# Patient Record
Sex: MALE | Race: WHITE | NOT HISPANIC OR LATINO | Employment: OTHER | URBAN - METROPOLITAN AREA
[De-identification: names, ages, dates, MRNs, and addresses within clinical notes are randomized per-mention and may not be internally consistent; named-entity substitution may affect disease eponyms.]

---

## 2018-12-08 ENCOUNTER — OFFICE VISIT (OUTPATIENT)
Dept: URGENT CARE | Facility: URGENT CARE | Age: 68
End: 2018-12-08
Payer: MEDICARE

## 2018-12-08 VITALS
TEMPERATURE: 98 F | HEART RATE: 78 BPM | OXYGEN SATURATION: 99 % | WEIGHT: 184 LBS | SYSTOLIC BLOOD PRESSURE: 142 MMHG | RESPIRATION RATE: 16 BRPM | DIASTOLIC BLOOD PRESSURE: 84 MMHG

## 2018-12-08 DIAGNOSIS — Z11.59 ENCOUNTER FOR SCREENING FOR OTHER VIRAL DISEASES: ICD-10-CM

## 2018-12-08 DIAGNOSIS — R23.8 VESICULAR RASH: Primary | ICD-10-CM

## 2018-12-08 DIAGNOSIS — Z11.3 SCREEN FOR STD (SEXUALLY TRANSMITTED DISEASE): ICD-10-CM

## 2018-12-08 DIAGNOSIS — N34.2 URETHRITIS: ICD-10-CM

## 2018-12-08 PROBLEM — J45.909 ASTHMA WITHOUT STATUS ASTHMATICUS: Status: ACTIVE | Noted: 2018-12-08

## 2018-12-08 PROCEDURE — 86780 TREPONEMA PALLIDUM: CPT | Performed by: FAMILY MEDICINE

## 2018-12-08 PROCEDURE — 87529 HSV DNA AMP PROBE: CPT | Performed by: FAMILY MEDICINE

## 2018-12-08 PROCEDURE — G0499 HEPB SCREEN HIGH RISK INDIV: HCPCS | Performed by: FAMILY MEDICINE

## 2018-12-08 PROCEDURE — 87389 HIV-1 AG W/HIV-1&-2 AB AG IA: CPT | Performed by: FAMILY MEDICINE

## 2018-12-08 PROCEDURE — 87591 N.GONORRHOEAE DNA AMP PROB: CPT | Performed by: FAMILY MEDICINE

## 2018-12-08 PROCEDURE — 99203 OFFICE O/P NEW LOW 30 MIN: CPT | Performed by: FAMILY MEDICINE

## 2018-12-08 PROCEDURE — 36415 COLL VENOUS BLD VENIPUNCTURE: CPT | Performed by: FAMILY MEDICINE

## 2018-12-08 PROCEDURE — G0472 HEP C SCREEN HIGH RISK/OTHER: HCPCS | Performed by: FAMILY MEDICINE

## 2018-12-08 PROCEDURE — 87491 CHLMYD TRACH DNA AMP PROBE: CPT | Performed by: FAMILY MEDICINE

## 2018-12-08 PROCEDURE — 86706 HEP B SURFACE ANTIBODY: CPT | Performed by: FAMILY MEDICINE

## 2018-12-08 RX ORDER — ALBUTEROL SULFATE 90 UG/1
2 AEROSOL, METERED RESPIRATORY (INHALATION)
COMMUNITY
Start: 2017-06-28 | End: 2023-09-07

## 2018-12-08 RX ORDER — KETOCONAZOLE 20 MG/G
CREAM TOPICAL 2 TIMES DAILY
Qty: 60 G | Refills: 1 | Status: SHIPPED | OUTPATIENT
Start: 2018-12-08 | End: 2023-09-07

## 2018-12-08 RX ORDER — TADALAFIL 5 MG/1
5 TABLET ORAL DAILY PRN
Status: ON HOLD | COMMUNITY
Start: 2018-04-15 | End: 2023-09-10

## 2018-12-08 RX ORDER — LISINOPRIL 5 MG/1
5 TABLET ORAL
COMMUNITY
Start: 2018-09-17 | End: 2023-09-07

## 2018-12-08 RX ORDER — DOXYCYCLINE 100 MG/1
100 CAPSULE ORAL 2 TIMES DAILY
Qty: 20 CAPSULE | Refills: 0 | Status: SHIPPED | OUTPATIENT
Start: 2018-12-08 | End: 2018-12-18

## 2018-12-08 RX ORDER — HYDROXYZINE HYDROCHLORIDE 10 MG/1
10 TABLET, FILM COATED ORAL
COMMUNITY
Start: 2018-11-21 | End: 2023-09-07

## 2018-12-08 RX ORDER — VALACYCLOVIR HYDROCHLORIDE 1 G/1
1000 TABLET, FILM COATED ORAL 2 TIMES DAILY
Qty: 20 TABLET | Refills: 0 | Status: SHIPPED | OUTPATIENT
Start: 2018-12-08 | End: 2023-09-07

## 2018-12-08 NOTE — PROGRESS NOTES
SUBJECTIVE:  Chief Complaint   Patient presents with     Urgent Care     2 days ago, rash, redness, burning       Penis/Scrotum Problem         Chilnago Alfonso Jr. is a 68 year old male   Who has concern of exposure to a sexually transmitted disease and would like testing .  He had a sexual encounter with a new female partner 10 days ago without using a condom, including oral genital contact.  Has not had sexual relations for years prior to this encounter    Patient has had no  No discharge,  some painful urination, no fevers/ chills.  He has noticed an area of redness with burning sensation on the shaft of the penis  -no ulcers or blisters  Also has had soreness of the roof of the mouth and inside the lower lip-  Symptoms present 2 days  He applied triple antibiotic to the area of skin burning with no change  No pain in the testicles or prostate area        Sexually active: yes, single new partner, contraception - none     Hx of previous symptom: none    Patient Active Problem List   Diagnosis     Asthma without status asthmaticus     Diverticulitis     Mixed hyperlipidemia     Hypertension     Insomnia     Neoplasm of unspecified nature of bone, soft tissue, and skin          ALLERGIES:  Simvastatin       Current Outpatient Prescriptions   Medication     albuterol (PROAIR HFA) 108 (90 Base) MCG/ACT inhaler     doxycycline hyclate (VIBRAMYCIN) 100 MG capsule     hydrOXYzine (ATARAX) 10 MG tablet     ketoconazole (NIZORAL) 2 % external cream     lisinopril (PRINIVIL/ZESTRIL) 5 MG tablet     tadalafil (CIALIS) 5 MG tablet     valACYclovir (VALTREX) 1000 mg tablet     No current facility-administered medications for this visit.            Social History   Substance Use Topics     Smoking status: Never Smoker     Smokeless tobacco: Never Used     Alcohol use Not on file       No family history on file.     Review Of Systems    Constitutional:  Negative for fevers, chills, fatigue  Skin:  Rash / redness on penis, no  blister lesions  Eyes: negative for eye pain, visual changes  Ears/Nose/Throat: negative for earache  , sinus trouble, some sore throat, sore mouth (palate and inner lip)  Respiratory: No shortness of breath, dyspnea on exertion    Gastrointestinal: negative for vomiting, abdominal pain    Genitourinary:  Dysuria, no penile discharge  And no hematuria  Back: negative for pain with back movement,  CVA pain  Musculoskeletal: negative for generalized joint pain, joint swelling    Neurologic: negative for generalized or local weakness    Endocrine: negative for thyroid disorder and diabetes      OBJECTIVE:  /84  Pulse 78  Temp 98  F (36.7  C) (Oral)  Resp 16  Wt 184 lb (83.5 kg)  SpO2 99%    :  Penis shaft  with 3 x3 cm area of erythema with shiny spots like vesicles want to form-  Reports burning at this site.  No sores of head of the penis  No penile discharge.  No tenderness or swelling of the testicles    No inguinal adenopathy    GENERAL APPEARANCE: healthy, alert and no distress  EYES: Eyes grossly normal to inspection and PERRL  HENT: TM's normal bilaterally and nose and mouth without erythema, ulcers or lesions  NECK: no adenopathy  RESP: lungs clear to auscultation - no rales, rhonchi or wheezes  CV: regular rates and rhythm, normal S1 S2, no murmur noted  ABDOMEN:  soft, nontender, no HSM or masses and bowel sounds normal  BACK: No CVA tenderness  MS:  extremities normal- no gross deformities noted, no erythema, FROM noted in all extremities  NEURO: Normal strength and tone, sensory exam grossly normal, mentation intact and speech normal        ASSESSMENT:  Vesicular rash    Suspect herpes of the shaft of the penis, but looks like blistering will start , but not now present-  He would like empiric treatment  - will also treat for fungal infection      - HSV 1 and 2 DNA by PCR- skin swab from site on the penis  - valACYclovir (VALTREX) 1000 mg tablet; Take 1 tablet (1,000 mg) by mouth 2 times  daily  - ketoconazole (NIZORAL) 2 % external cream; Apply topically 2 times daily    Screen for STD (sexually transmitted disease)     - NEISSERIA GONORRHOEA PCR  - CHLAMYDIA TRACHOMATIS PCR  - Hepatitis B surface antigen  - Hepatitis C antibody  - HIV Antigen Antibody Combo  - Treponema Abs w Reflex to RPR and Titer  - Hepatitis B Surface Antibody       We discussed the time frames that a test may turn positive after exposure and that repeat testing may be necessary to confirm that the illness has not been transmitted  Recommend use of condoms to prevent possible transmission of infection until definitive negative testing has been confirmed.    Encounter for screening for other viral diseases      Discussed hep b as STI-   Medicare required this DX  - Hepatitis B surface antigen  - Hepatitis B Surface Antibody    Urethritis     - doxycycline hyclate (VIBRAMYCIN) 100 MG capsule; Take 1 capsule (100 mg) by mouth 2 times daily for 10 days    Discussed that urethritis could be from mycoplasma/ ureaplasma/ chlamydia- that doxy will cover all-  He wanted empiric treatment

## 2018-12-08 NOTE — MR AVS SNAPSHOT
After Visit Summary   12/8/2018    Chilango Alfonso Jr.    MRN: [id number]           Patient Information     Date Of Birth          1950        Visit Information        Provider Department      12/8/2018 8:55 AM Tiki Flores MD [facility] URGENT [facility]        Today's Diagnoses     Vesicular rash    -  1    Screen for STD (sexually transmitted disease)        Encounter for screening for other viral diseases         Urethritis          Care Instructions      Genital Herpes    Genital herpes is a common sexually transmitted infection (STI). It is caused by the herpes simplex virus (HSV). One out of 5 teens and adults carry the herpes virus. During an outbreak, it causes small blisters that break open, leaving small, painful sores in the genital area. Eventually, scabs form and the sores heal. In women, these show up most often on the skin just outside the vaginal opening. They can occur on the buttocks, anus, or cervix. In men, the sores are usually on the tip, sides, or base of the penis. They also occur on the scrotum, buttocks, or thighs.  The first outbreak begins within 2 to 3 weeks after exposure to an infected sexual partner. It may last 1 to 3 weeks. It may cause headache, muscle ache, and fevers. The first outbreak is usually the worst. Because the virus remains in the body even after the sores heal, most people will have more outbreaks. The frequency of outbreaks is different for each person. Some people will never have another outbreak. Others will have several episodes a year. Later outbreaks are usually shorter, milder, and less painful. For many, the number of outbreaks tends to decrease over time. Various factors may trigger an outbreak. These include:    Emotional stress    Menstruation    Presence of another illness (cold, flu, or fever from any cause)    Overexertion and fatigue    Weak immune system  Home care    It is very important that you do not have sexual  relations until all the herpes sores have healed completely.    Wash the affected area gently with mild soap and water. Wash your hands after touching the affected area.    You may use over-the-counter pain medicine unless another pain medicine was prescribed. If you have chronic liver or kidney disease or have ever had a stomach ulcer or gastrointestinal bleeding, talk with your healthcare provider before using these medicines. Also talk to your provider if you are taking medicine to prevent blood clots. Aspirin should never be given to anyone younger than 18 years of age who is ill with a viral infection or fever. It may cause severe liver or brain damage.    Your healthcare provider may prescribe antiviral medicine during the first outbreak. This will help the sores heal faster. Antiviral medicine may also be prescribed so that you have it ready to take at the first sign of another outbreak. This will help the symptoms go away sooner. For people with frequent outbreaks, daily preventive therapy may be prescribed. This will help reduce the frequency of attacks. Daily preventive therapy may also reduce risk of spread of herpes to your sexual partner. Discuss the risks and benefits of daily therapy with your healthcare provider.    If you are a woman who is pregnant now or may become pregnant in the future, let your healthcare provider know that you have had herpes. This may affect the way your baby is delivered.  Preventing spread to others  The virus is spread by sexual contact with someone who has the herpes virus. The risk of spread is highest when the sores are present. However, there is a chance of spreading the virus even when sores are not visible. Inform future sexual partners that you have herpes and that they may become infected. To reduce the risk of passing the virus to a partner who has never had herpes, avoid sexual relations at the first sign of an outbreak and until the sores are fully healed. Latex  barriers, such as condoms, reduce the risk of spread between outbreaks if the infected site is covered, but they do not guarantee protection.  Follow-up care  Follow up with your healthcare provider, or as advised.  People who have just learned that they have herpes may feel upset. Getting the facts about herpes can help you feel more in control. Follow up with your healthcare provider or the public health department for complete STI screening, including HIV testing.  When to seek medical advice  Call your healthcare provider right away if any of these occur:    Inability to urinate due to pain    Swelling or increasing redness in the genital area    Discharge from the vagina or penis    Increasing back or abdominal pain    Rash or joint pain  Call 911  Call 911 or get immediate medical care if any of these occur:    Unusual drowsiness, weakness, or confusion    Worsening headache or stiff neck   Date Last Reviewed: 6/1/2018 2000-2018 [employer]. [address] [place] [address]. All rights reserved. This information is not intended as a substitute for professional medical care. Always follow your healthcare professional's instructions.        Testing for Suspected STI  Your symptoms suggest that you may have a sexually transmitted infection (STI). The most common bacteria that cause STIs are chlamydia and gonorrhea. Both are highly contagious. They are passed by sexual contact with an infected partner.  Symptoms start within 1 to 3 weeks after exposure. There is usually a discharge from the penis or vagina and burning during urination. Many women with one of these infections will have only mild symptoms or no symptoms at all early in the disease.  Tests have been done to show if you have an infection with chlamydia or gonorrhea. These infections can be treated and cured with antibiotic medicine.  Home care  Don't have sex until you know that your test result is negative.  Call for the  results of your tests. If the test is positive, contact your healthcare provider, local clinic, or local public health department to be treated, or return to our facility.    You will be prescribed antibiotic medicine. Be sure to take all of the antibiotic as prescribed until it is gone or you are told to stop. Keep taking it even if you feel better.    Both you and your sexual partner or partners need to be treated, even if the partner has no symptoms.    Don't have sex until both you and your partner or partners have finished all antibiotic medicine and you are told that you are no longer contagious.  Learn about safe sex practices and use these in the future. The safest sex is with a partner who has tested negative for STIs and only has sex with you. Condoms can help prevent the spread of gonorrhea and chlamydia, but are not a guarantee.  Follow-up care  Follow up with your healthcare provider, or as advised. Call as directed for the results of your test. This is to be sure the infection has cleared. Follow up with your provider or the public health department for complete STI screening, including HIV testing, and to consider ways to prevent HIV. For more information about STIs, call the CDC information line at 082-320-1233 or look at the CDC website online.  When to seek medical advice  Call your healthcare provider if any of these occur:    Fever of 100.4 F (38.0 C) or higher, or as directed    New pain in your lower belly (abdomen) or back or pain that gets worse    Unexpected vaginal bleeding    Weakness, dizziness, or fainting    Repeated vomiting    Inability to urinate because of pain    Rash or joint pain    Painful open sores on the penis, or in or around the outer vagina or rectum    Enlarged painful lumps (lymph nodes) in the groin    Testicle pain or scrotal swelling in men   Date Last Reviewed: 4/1/2018 2000-2018 [employer]. [address] [place] [address], [place] [address]. All rights  reserved. This information is not intended as a substitute for professional medical care. Always follow your healthcare professional's instructions.        Urethritis in Men     An inflamed urethra can cause pain during urination.   The urethra is the tube that runs from the bladder through the penis. When the urethra is inflamed, it is called urethritis. The urethra becomes swollen and causes burning pain when you urinate. Other symptoms of urethritis may include itching or tingling of the penis or pus discharge from the penis. You may also have pain with sex and masturbation. Some men may not have symptoms.  What causes urethritis?  Urethritis can be caused by a bacterial or viral infection. Such an infection can lead to conditions such as a urinary tract infection (UTI) or sexually transmitted diseases (STD). Urethritis can also be caused by injury or sensitivity or allergy to chemicals in lotions and other products.  How is urethritis diagnosed?  Your healthcare provider will examine you and ask about your symptoms and health history. You may also have one or more of the following tests:    Urine test to take samples of urine and have them checked for problems.    Blood test to take a sample of blood and have it checked for problems.    Urethral discharge to take a sample of fluid from inside the urethra. A cotton swab is inserted into the opening of the penis and into the urethra.    Cystoscopy to allow the healthcare provider to look for problems in the urinary tract. The test uses a thin, flexible telescope called a cystoscope with a light and camera attached. The scope is inserted into the urethra.  How is urethritis treated?  Treatment depends on the cause of urethritis. If it s due to a bacterial infection, antibiotics (medicines that fight infection) will be given. Your healthcare provider can tell you more about your treatment options. In the meantime, your symptoms can be treated. To relieve pain and  swelling, anti-inflammatory medications, such as ibuprofen, may be given. Untreated, symptoms may get worse. Also, scar tissue can form in the urethra, causing it to narrow.  When to call your healthcare provider   Call your healthcare provider right away if you have any of the following:    Fever of 100.4 F (38.0 C) or higher     Blood in the urine or semen    Burning pain with urination    Increased urge to urinate    Discharge from the penis    Itching, tenderness, or swelling in the penis or groin    Pain during sex or masturbation   Preventing STDs  When it comes to sex, it s important to take care and be safe. Any sexual contact with the penis, vagina, anus, or mouth can spread an STD. The only sure way to prevent STDs is not having sex. But there are ways to make sex safer. Use a latex condom each time you have sex. And talk to your partner about STDs before you have sex.  Date Last Reviewed: 1/1/2017 2000-2018 [employer]. [address] [place] [address]. All rights reserved. This information is not intended as a substitute for professional medical care. Always follow your healthcare professional's instructions.                Follow-ups after your visit        Who to contact     If you have questions or need follow up information about today's clinic visit or your schedule please contact [facility] directly at [phone].  Normal or non-critical lab and imaging results will be communicated to you by MyChart, letter or phone within 4 business days after the clinic has received the results. If you do not hear from us within 7 days, please contact the clinic through MyChart or phone. If you have a critical or abnormal lab result, we will notify you by phone as soon as possible.  Submit refill requests through [employer] or call your pharmacy and they will forward the refill request to us. Please allow 3 business days for your refill to be completed.           "Additional Information About Your Visit        MyChart Information     [employer] lets you send messages to your doctor, view your test results, renew your prescriptions, schedule appointments and more. To sign up, go to [email or web address] . Click on \"Log in\" on the left side of the screen, which will take you to the Welcome page. Then click on \"Sign up Now\" on the right side of the page.     You will be asked to enter the access code listed below, as well as some personal information. Please follow the directions to create your username and password.     Your access code is: [email or web address]  Expires: 3/8/2019  9:51 AM     Your access code will  in 90 days. If you need help or a new code, please call your [facility] clinic or [phone].        Care EveryWhere ID     This is your Care EveryWhere ID. This could be used by other organizations to access your [facility] medical records  [id number]        Your Vitals Were     Pulse Temperature Respirations Pulse Oximetry          78 98  F (36.7  C) (Oral) 16 99%         Blood Pressure from Last 3 Encounters:   18 142/84    Weight from Last 3 Encounters:   18 184 lb (83.5 kg)              We Performed the Following     CHLAMYDIA TRACHOMATIS PCR     Hepatitis B Surface Antibody     Hepatitis B surface antigen     Hepatitis C antibody     HIV Antigen Antibody Combo     HSV 1 and 2 DNA by PCR     NEISSERIA GONORRHOEA PCR     Treponema Abs w Reflex to RPR and Titer          Today's Medication Changes          These changes are accurate as of 18  9:51 AM.  If you have any questions, ask your nurse or doctor.               Start taking these medicines.        Dose/Directions    doxycycline hyclate 100 MG capsule   Commonly known as:  VIBRAMYCIN   Used for:  Urethritis   Started by:  Tiki Flores MD        Dose:  100 mg   Take 1 capsule (100 mg) by mouth 2 times daily for 10 days   Quantity:  20 capsule   Refills:  0       valACYclovir 1000 mg tablet "   Commonly known as:  VALTREX   Used for:  Vesicular rash   Started by:  Tiki Flores MD        Dose:  1000 mg   Take 1 tablet (1,000 mg) by mouth 2 times daily   Quantity:  20 tablet   Refills:  0            Where to get your medicines      These medications were sent to [id number] - [place], [place] - [address]  [address], [facility] [address]     Phone:  [phone]     doxycycline hyclate 100 MG capsule    valACYclovir 1000 mg tablet                Primary Care Provider Office Phone # Fax #    Luis Miguel Gutierrez [phone]       [facility] [address]  [facility] [address]        Equal Access to Services     [facility] HospitalJOSE ALBERTO : Hadii gus castillo hadasho Soomaali, waaxda luqadaha, qaybta kaalmada adeegyada, kristi ramirez . So [facility] [phone].    ATENCIÓN: Si habla español, tiene a lacey disposición servicios gratuitos de asistencia lingüística. Llame al [phone].    We comply with applicable federal civil rights laws and [place] laws. We do not discriminate on the basis of race, color, national origin, age, disability, sex, sexual orientation, or gender identity.            Thank you!     Thank you for choosing [facility]  for your care. Our goal is always to provide you with excellent care. Hearing back from our patients is one way we can continue to improve our services. Please take a few minutes to complete the written survey that you may receive in the mail after your visit with us. Thank you!             Your Updated Medication List - Protect others around you: Learn how to safely use, store and throw away your medicines at www.disposemymeds.org.          This list is accurate as of 12/8/18  9:51 AM.  Always use your most recent med list.                   Brand Name Dispense Instructions for use Diagnosis    doxycycline hyclate 100 MG capsule    VIBRAMYCIN    20 capsule    Take 1 capsule (100 mg) by mouth 2 times daily  for 10 days    Urethritis       valACYclovir 1000 mg tablet    VALTREX    20 tablet    Take 1 tablet (1,000 mg) by mouth 2 times daily    Vesicular rash

## 2018-12-08 NOTE — PATIENT INSTRUCTIONS
Genital Herpes    Genital herpes is a common sexually transmitted infection (STI). It is caused by the herpes simplex virus (HSV). One out of 5 teens and adults carry the herpes virus. During an outbreak, it causes small blisters that break open, leaving small, painful sores in the genital area. Eventually, scabs form and the sores heal. In women, these show up most often on the skin just outside the vaginal opening. They can occur on the buttocks, anus, or cervix. In men, the sores are usually on the tip, sides, or base of the penis. They also occur on the scrotum, buttocks, or thighs.  The first outbreak begins within 2 to 3 weeks after exposure to an infected sexual partner. It may last 1 to 3 weeks. It may cause headache, muscle ache, and fevers. The first outbreak is usually the worst. Because the virus remains in the body even after the sores heal, most people will have more outbreaks. The frequency of outbreaks is different for each person. Some people will never have another outbreak. Others will have several episodes a year. Later outbreaks are usually shorter, milder, and less painful. For many, the number of outbreaks tends to decrease over time. Various factors may trigger an outbreak. These include:    Emotional stress    Menstruation    Presence of another illness (cold, flu, or fever from any cause)    Overexertion and fatigue    Weak immune system  Home care    It is very important that you do not have sexual relations until all the herpes sores have healed completely.    Wash the affected area gently with mild soap and water. Wash your hands after touching the affected area.    You may use over-the-counter pain medicine unless another pain medicine was prescribed. If you have chronic liver or kidney disease or have ever had a stomach ulcer or gastrointestinal bleeding, talk with your healthcare provider before using these medicines. Also talk to your provider if you are taking medicine to prevent  blood clots. Aspirin should never be given to anyone younger than 18 years of age who is ill with a viral infection or fever. It may cause severe liver or brain damage.    Your healthcare provider may prescribe antiviral medicine during the first outbreak. This will help the sores heal faster. Antiviral medicine may also be prescribed so that you have it ready to take at the first sign of another outbreak. This will help the symptoms go away sooner. For people with frequent outbreaks, daily preventive therapy may be prescribed. This will help reduce the frequency of attacks. Daily preventive therapy may also reduce risk of spread of herpes to your sexual partner. Discuss the risks and benefits of daily therapy with your healthcare provider.    If you are a woman who is pregnant now or may become pregnant in the future, let your healthcare provider know that you have had herpes. This may affect the way your baby is delivered.  Preventing spread to others  The virus is spread by sexual contact with someone who has the herpes virus. The risk of spread is highest when the sores are present. However, there is a chance of spreading the virus even when sores are not visible. Inform future sexual partners that you have herpes and that they may become infected. To reduce the risk of passing the virus to a partner who has never had herpes, avoid sexual relations at the first sign of an outbreak and until the sores are fully healed. Latex barriers, such as condoms, reduce the risk of spread between outbreaks if the infected site is covered, but they do not guarantee protection.  Follow-up care  Follow up with your healthcare provider, or as advised.  People who have just learned that they have herpes may feel upset. Getting the facts about herpes can help you feel more in control. Follow up with your healthcare provider or the public health department for complete STI screening, including HIV testing.  When to seek medical  advice  Call your healthcare provider right away if any of these occur:    Inability to urinate due to pain    Swelling or increasing redness in the genital area    Discharge from the vagina or penis    Increasing back or abdominal pain    Rash or joint pain  Call 911  Call 911 or get immediate medical care if any of these occur:    Unusual drowsiness, weakness, or confusion    Worsening headache or stiff neck   Date Last Reviewed: 6/1/2018 2000-2018 [employer]. [address] [place] [address], [place] [address]. All rights reserved. This information is not intended as a substitute for professional medical care. Always follow your healthcare professional's instructions.        Testing for Suspected STI  Your symptoms suggest that you may have a sexually transmitted infection (STI). The most common bacteria that cause STIs are chlamydia and gonorrhea. Both are highly contagious. They are passed by sexual contact with an infected partner.  Symptoms start within 1 to 3 weeks after exposure. There is usually a discharge from the penis or vagina and burning during urination. Many women with one of these infections will have only mild symptoms or no symptoms at all early in the disease.  Tests have been done to show if you have an infection with chlamydia or gonorrhea. These infections can be treated and cured with antibiotic medicine.  Home care  Don't have sex until you know that your test result is negative.  Call for the results of your tests. If the test is positive, contact your healthcare provider, local clinic, or local public health department to be treated, or return to our facility.    You will be prescribed antibiotic medicine. Be sure to take all of the antibiotic as prescribed until it is gone or you are told to stop. Keep taking it even if you feel better.    Both you and your sexual partner or partners need to be treated, even if the partner has no symptoms.    Don't have sex until both you and  your partner or partners have finished all antibiotic medicine and you are told that you are no longer contagious.  Learn about safe sex practices and use these in the future. The safest sex is with a partner who has tested negative for STIs and only has sex with you. Condoms can help prevent the spread of gonorrhea and chlamydia, but are not a guarantee.  Follow-up care  Follow up with your healthcare provider, or as advised. Call as directed for the results of your test. This is to be sure the infection has cleared. Follow up with your provider or the public health department for complete STI screening, including HIV testing, and to consider ways to prevent HIV. For more information about STIs, call the CDC information line at 307-913-3677 or look at the CDC website online.  When to seek medical advice  Call your healthcare provider if any of these occur:    Fever of 100.4 F (38.0 C) or higher, or as directed    New pain in your lower belly (abdomen) or back or pain that gets worse    Unexpected vaginal bleeding    Weakness, dizziness, or fainting    Repeated vomiting    Inability to urinate because of pain    Rash or joint pain    Painful open sores on the penis, or in or around the outer vagina or rectum    Enlarged painful lumps (lymph nodes) in the groin    Testicle pain or scrotal swelling in men   Date Last Reviewed: 4/1/2018 2000-2018 [employer]. [address] [place] [address], [place] [id number]. All rights reserved. This information is not intended as a substitute for professional medical care. Always follow your healthcare professional's instructions.        Urethritis in Men     An inflamed urethra can cause pain during urination.   The urethra is the tube that runs from the bladder through the penis. When the urethra is inflamed, it is called urethritis. The urethra becomes swollen and causes burning pain when you urinate. Other symptoms of urethritis may include itching or tingling of the  penis or pus discharge from the penis. You may also have pain with sex and masturbation. Some men may not have symptoms.  What causes urethritis?  Urethritis can be caused by a bacterial or viral infection. Such an infection can lead to conditions such as a urinary tract infection (UTI) or sexually transmitted diseases (STD). Urethritis can also be caused by injury or sensitivity or allergy to chemicals in lotions and other products.  How is urethritis diagnosed?  Your healthcare provider will examine you and ask about your symptoms and health history. You may also have one or more of the following tests:    Urine test to take samples of urine and have them checked for problems.    Blood test to take a sample of blood and have it checked for problems.    Urethral discharge to take a sample of fluid from inside the urethra. A cotton swab is inserted into the opening of the penis and into the urethra.    Cystoscopy to allow the healthcare provider to look for problems in the urinary tract. The test uses a thin, flexible telescope called a cystoscope with a light and camera attached. The scope is inserted into the urethra.  How is urethritis treated?  Treatment depends on the cause of urethritis. If it s due to a bacterial infection, antibiotics (medicines that fight infection) will be given. Your healthcare provider can tell you more about your treatment options. In the meantime, your symptoms can be treated. To relieve pain and swelling, anti-inflammatory medications, such as ibuprofen, may be given. Untreated, symptoms may get worse. Also, scar tissue can form in the urethra, causing it to narrow.  When to call your healthcare provider   Call your healthcare provider right away if you have any of the following:    Fever of 100.4 F (38.0 C) or higher     Blood in the urine or semen    Burning pain with urination    Increased urge to urinate    Discharge from the penis    Itching, tenderness, or swelling in the penis  or groin    Pain during sex or masturbation   Preventing STDs  When it comes to sex, it s important to take care and be safe. Any sexual contact with the penis, vagina, anus, or mouth can spread an STD. The only sure way to prevent STDs is not having sex. But there are ways to make sex safer. Use a latex condom each time you have sex. And talk to your partner about STDs before you have sex.  Date Last Reviewed: 1/1/2017 2000-2018 [employer]. [address] [place] [address], [place] [address]. All rights reserved. This information is not intended as a substitute for professional medical care. Always follow your healthcare professional's instructions.

## 2018-12-09 LAB
C TRACH DNA SPEC QL NAA+PROBE: NEGATIVE
N GONORRHOEA DNA SPEC QL NAA+PROBE: NEGATIVE
SPECIMEN SOURCE: NORMAL
SPECIMEN SOURCE: NORMAL
T PALLIDUM AB SER QL: NONREACTIVE

## 2018-12-10 LAB
HBV SURFACE AB SERPL IA-ACNC: 1.38 M[IU]/ML
HBV SURFACE AG SERPL QL IA: NONREACTIVE
HCV AB SERPL QL IA: NONREACTIVE
HIV 1+2 AB+HIV1 P24 AG SERPL QL IA: NONREACTIVE
HSV1 DNA SPEC QL NAA+PROBE: NEGATIVE
HSV2 DNA SPEC QL NAA+PROBE: NEGATIVE
SPECIMEN SOURCE: NORMAL

## 2019-10-02 ENCOUNTER — HEALTH MAINTENANCE LETTER (OUTPATIENT)
Age: 69
End: 2019-10-02

## 2019-12-16 ENCOUNTER — HEALTH MAINTENANCE LETTER (OUTPATIENT)
Age: 69
End: 2019-12-16

## 2021-01-15 ENCOUNTER — HEALTH MAINTENANCE LETTER (OUTPATIENT)
Age: 71
End: 2021-01-15

## 2021-09-05 ENCOUNTER — HEALTH MAINTENANCE LETTER (OUTPATIENT)
Age: 71
End: 2021-09-05

## 2022-02-20 ENCOUNTER — HEALTH MAINTENANCE LETTER (OUTPATIENT)
Age: 72
End: 2022-02-20

## 2022-10-22 ENCOUNTER — HEALTH MAINTENANCE LETTER (OUTPATIENT)
Age: 72
End: 2022-10-22

## 2023-04-01 ENCOUNTER — HEALTH MAINTENANCE LETTER (OUTPATIENT)
Age: 73
End: 2023-04-01

## 2023-09-06 ENCOUNTER — APPOINTMENT (OUTPATIENT)
Dept: CT IMAGING | Facility: CLINIC | Age: 73
End: 2023-09-06
Attending: EMERGENCY MEDICINE
Payer: MEDICARE

## 2023-09-06 ENCOUNTER — HOSPITAL ENCOUNTER (OUTPATIENT)
Facility: CLINIC | Age: 73
Setting detail: OBSERVATION
Discharge: HOME | End: 2023-09-10
Attending: EMERGENCY MEDICINE | Admitting: INTERNAL MEDICINE
Payer: MEDICARE

## 2023-09-06 ENCOUNTER — APPOINTMENT (OUTPATIENT)
Dept: MRI IMAGING | Facility: CLINIC | Age: 73
End: 2023-09-06
Attending: EMERGENCY MEDICINE
Payer: MEDICARE

## 2023-09-06 DIAGNOSIS — F41.9 ANXIETY: ICD-10-CM

## 2023-09-06 DIAGNOSIS — F10.920 ALCOHOLIC INTOXICATION WITHOUT COMPLICATION (H): ICD-10-CM

## 2023-09-06 DIAGNOSIS — E78.2 MIXED HYPERLIPIDEMIA: ICD-10-CM

## 2023-09-06 DIAGNOSIS — R41.0 CONFUSION: Primary | ICD-10-CM

## 2023-09-06 LAB
ALBUMIN UR-MCNC: NEGATIVE MG/DL
ANION GAP SERPL CALCULATED.3IONS-SCNC: 13 MMOL/L (ref 7–15)
APPEARANCE UR: CLEAR
APTT PPP: 27 SECONDS (ref 22–38)
BASOPHILS # BLD AUTO: 0.1 10E3/UL (ref 0–0.2)
BASOPHILS NFR BLD AUTO: 1 %
BILIRUB UR QL STRIP: NEGATIVE
BUN SERPL-MCNC: 23.8 MG/DL (ref 8–23)
CALCIUM SERPL-MCNC: 9.2 MG/DL (ref 8.8–10.2)
CHLORIDE SERPL-SCNC: 101 MMOL/L (ref 98–107)
COLOR UR AUTO: ABNORMAL
CREAT SERPL-MCNC: 1.33 MG/DL (ref 0.67–1.17)
DEPRECATED HCO3 PLAS-SCNC: 22 MMOL/L (ref 22–29)
EGFRCR SERPLBLD CKD-EPI 2021: 56 ML/MIN/1.73M2
EOSINOPHIL # BLD AUTO: 0.2 10E3/UL (ref 0–0.7)
EOSINOPHIL NFR BLD AUTO: 2 %
ERYTHROCYTE [DISTWIDTH] IN BLOOD BY AUTOMATED COUNT: 12.6 % (ref 10–15)
ETHANOL SERPL-MCNC: 0.25 G/DL
GLUCOSE SERPL-MCNC: 82 MG/DL (ref 70–99)
GLUCOSE UR STRIP-MCNC: NEGATIVE MG/DL
HCT VFR BLD AUTO: 40.2 % (ref 40–53)
HGB BLD-MCNC: 13.6 G/DL (ref 13.3–17.7)
HGB UR QL STRIP: NEGATIVE
HOLD SPECIMEN: NORMAL
IMM GRANULOCYTES # BLD: 0 10E3/UL
IMM GRANULOCYTES NFR BLD: 0 %
INR PPP: 1.03 (ref 0.85–1.15)
KETONES UR STRIP-MCNC: NEGATIVE MG/DL
LEUKOCYTE ESTERASE UR QL STRIP: NEGATIVE
LYMPHOCYTES # BLD AUTO: 2.8 10E3/UL (ref 0.8–5.3)
LYMPHOCYTES NFR BLD AUTO: 27 %
MAGNESIUM SERPL-MCNC: 2.2 MG/DL (ref 1.7–2.3)
MCH RBC QN AUTO: 33.3 PG (ref 26.5–33)
MCHC RBC AUTO-ENTMCNC: 33.8 G/DL (ref 31.5–36.5)
MCV RBC AUTO: 99 FL (ref 78–100)
MONOCYTES # BLD AUTO: 0.9 10E3/UL (ref 0–1.3)
MONOCYTES NFR BLD AUTO: 9 %
MUCOUS THREADS #/AREA URNS LPF: PRESENT /LPF
NEUTROPHILS # BLD AUTO: 6.1 10E3/UL (ref 1.6–8.3)
NEUTROPHILS NFR BLD AUTO: 61 %
NITRATE UR QL: NEGATIVE
NRBC # BLD AUTO: 0 10E3/UL
NRBC BLD AUTO-RTO: 0 /100
PH UR STRIP: 5 [PH] (ref 5–7)
PLATELET # BLD AUTO: 224 10E3/UL (ref 150–450)
POTASSIUM SERPL-SCNC: 3.8 MMOL/L (ref 3.4–5.3)
RBC # BLD AUTO: 4.08 10E6/UL (ref 4.4–5.9)
RBC URINE: <1 /HPF
SODIUM SERPL-SCNC: 136 MMOL/L (ref 136–145)
SP GR UR STRIP: 1.02 (ref 1–1.03)
TROPONIN T SERPL HS-MCNC: 12 NG/L
UROBILINOGEN UR STRIP-MCNC: NORMAL MG/DL
WBC # BLD AUTO: 10.2 10E3/UL (ref 4–11)
WBC URINE: <1 /HPF

## 2023-09-06 PROCEDURE — 85610 PROTHROMBIN TIME: CPT | Performed by: EMERGENCY MEDICINE

## 2023-09-06 PROCEDURE — 36415 COLL VENOUS BLD VENIPUNCTURE: CPT | Performed by: EMERGENCY MEDICINE

## 2023-09-06 PROCEDURE — 85730 THROMBOPLASTIN TIME PARTIAL: CPT | Performed by: EMERGENCY MEDICINE

## 2023-09-06 PROCEDURE — 258N000003 HC RX IP 258 OP 636: Performed by: EMERGENCY MEDICINE

## 2023-09-06 PROCEDURE — 250N000011 HC RX IP 250 OP 636: Performed by: EMERGENCY MEDICINE

## 2023-09-06 PROCEDURE — 84484 ASSAY OF TROPONIN QUANT: CPT | Performed by: EMERGENCY MEDICINE

## 2023-09-06 PROCEDURE — 82077 ASSAY SPEC XCP UR&BREATH IA: CPT | Performed by: EMERGENCY MEDICINE

## 2023-09-06 PROCEDURE — 96376 TX/PRO/DX INJ SAME DRUG ADON: CPT | Mod: XU

## 2023-09-06 PROCEDURE — 250N000009 HC RX 250: Performed by: EMERGENCY MEDICINE

## 2023-09-06 PROCEDURE — 82310 ASSAY OF CALCIUM: CPT | Performed by: EMERGENCY MEDICINE

## 2023-09-06 PROCEDURE — 70498 CT ANGIOGRAPHY NECK: CPT | Mod: MA

## 2023-09-06 PROCEDURE — 99285 EMERGENCY DEPT VISIT HI MDM: CPT | Mod: 25

## 2023-09-06 PROCEDURE — 81001 URINALYSIS AUTO W/SCOPE: CPT | Performed by: EMERGENCY MEDICINE

## 2023-09-06 PROCEDURE — 70496 CT ANGIOGRAPHY HEAD: CPT | Mod: MA

## 2023-09-06 PROCEDURE — 70450 CT HEAD/BRAIN W/O DYE: CPT | Mod: MA

## 2023-09-06 PROCEDURE — 70551 MRI BRAIN STEM W/O DYE: CPT | Mod: MA

## 2023-09-06 PROCEDURE — 93005 ELECTROCARDIOGRAM TRACING: CPT

## 2023-09-06 PROCEDURE — 83735 ASSAY OF MAGNESIUM: CPT | Performed by: EMERGENCY MEDICINE

## 2023-09-06 PROCEDURE — 85025 COMPLETE CBC W/AUTO DIFF WBC: CPT | Performed by: EMERGENCY MEDICINE

## 2023-09-06 PROCEDURE — 96374 THER/PROPH/DIAG INJ IV PUSH: CPT | Mod: 59

## 2023-09-06 PROCEDURE — 99223 1ST HOSP IP/OBS HIGH 75: CPT | Mod: AI | Performed by: INTERNAL MEDICINE

## 2023-09-06 RX ORDER — LORAZEPAM 2 MG/ML
0.5 INJECTION INTRAMUSCULAR ONCE
Status: COMPLETED | OUTPATIENT
Start: 2023-09-06 | End: 2023-09-06

## 2023-09-06 RX ORDER — IOPAMIDOL 755 MG/ML
500 INJECTION, SOLUTION INTRAVASCULAR ONCE
Status: COMPLETED | OUTPATIENT
Start: 2023-09-06 | End: 2023-09-06

## 2023-09-06 RX ADMIN — SODIUM CHLORIDE 80 ML: 9 INJECTION, SOLUTION INTRAVENOUS at 18:42

## 2023-09-06 RX ADMIN — IOPAMIDOL 75 ML: 755 INJECTION, SOLUTION INTRAVENOUS at 18:42

## 2023-09-06 RX ADMIN — SODIUM CHLORIDE 1000 ML: 9 INJECTION, SOLUTION INTRAVENOUS at 23:50

## 2023-09-06 RX ADMIN — LORAZEPAM 0.5 MG: 2 INJECTION INTRAMUSCULAR; INTRAVENOUS at 22:37

## 2023-09-06 RX ADMIN — LORAZEPAM 0.5 MG: 2 INJECTION INTRAMUSCULAR; INTRAVENOUS at 20:53

## 2023-09-06 ASSESSMENT — ACTIVITIES OF DAILY LIVING (ADL)
ADLS_ACUITY_SCORE: 35
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
## 2023-09-06 NOTE — ED NOTES
Assisted with patient triage (ambulance). Placed Pt. on monitor (5-lead continous ECG, pulse ox, BP cuff) EKG complete. [facility]
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
## 2023-09-07 LAB
AMPHETAMINES UR QL SCN: NORMAL
ANION GAP SERPL CALCULATED.3IONS-SCNC: 16 MMOL/L (ref 7–15)
ATRIAL RATE - MUSE: 78 BPM
BARBITURATES UR QL SCN: NORMAL
BENZODIAZ UR QL SCN: NORMAL
BUN SERPL-MCNC: 20.2 MG/DL (ref 8–23)
BZE UR QL SCN: NORMAL
CALCIUM SERPL-MCNC: 9.1 MG/DL (ref 8.8–10.2)
CANNABINOIDS UR QL SCN: NORMAL
CHLORIDE SERPL-SCNC: 103 MMOL/L (ref 98–107)
CREAT SERPL-MCNC: 1.32 MG/DL (ref 0.67–1.17)
DEPRECATED HCO3 PLAS-SCNC: 19 MMOL/L (ref 22–29)
DIASTOLIC BLOOD PRESSURE - MUSE: NORMAL MMHG
EGFRCR SERPLBLD CKD-EPI 2021: 56 ML/MIN/1.73M2
ERYTHROCYTE [DISTWIDTH] IN BLOOD BY AUTOMATED COUNT: 12.5 % (ref 10–15)
GLUCOSE SERPL-MCNC: 92 MG/DL (ref 70–99)
HCT VFR BLD AUTO: 39.6 % (ref 40–53)
HGB BLD-MCNC: 13.7 G/DL (ref 13.3–17.7)
INTERPRETATION ECG - MUSE: NORMAL
MAGNESIUM SERPL-MCNC: 1.8 MG/DL (ref 1.7–2.3)
MCH RBC QN AUTO: 33.7 PG (ref 26.5–33)
MCHC RBC AUTO-ENTMCNC: 34.6 G/DL (ref 31.5–36.5)
MCV RBC AUTO: 97 FL (ref 78–100)
OPIATES UR QL SCN: NORMAL
P AXIS - MUSE: 64 DEGREES
PHOSPHATE SERPL-MCNC: 3.7 MG/DL (ref 2.5–4.5)
PLATELET # BLD AUTO: 210 10E3/UL (ref 150–450)
POTASSIUM SERPL-SCNC: 4.3 MMOL/L (ref 3.4–5.3)
PR INTERVAL - MUSE: 188 MS
QRS DURATION - MUSE: 90 MS
QT - MUSE: 392 MS
QTC - MUSE: 446 MS
R AXIS - MUSE: 9 DEGREES
RBC # BLD AUTO: 4.07 10E6/UL (ref 4.4–5.9)
SODIUM SERPL-SCNC: 138 MMOL/L (ref 136–145)
SYSTOLIC BLOOD PRESSURE - MUSE: NORMAL MMHG
T AXIS - MUSE: 40 DEGREES
VENTRICULAR RATE- MUSE: 78 BPM
WBC # BLD AUTO: 11.2 10E3/UL (ref 4–11)

## 2023-09-07 PROCEDURE — 99233 SBSQ HOSP IP/OBS HIGH 50: CPT | Performed by: INTERNAL MEDICINE

## 2023-09-07 PROCEDURE — 80307 DRUG TEST PRSMV CHEM ANLYZR: CPT | Performed by: INTERNAL MEDICINE

## 2023-09-07 PROCEDURE — 85027 COMPLETE CBC AUTOMATED: CPT | Performed by: INTERNAL MEDICINE

## 2023-09-07 PROCEDURE — G0378 HOSPITAL OBSERVATION PER HR: HCPCS

## 2023-09-07 PROCEDURE — 80048 BASIC METABOLIC PNL TOTAL CA: CPT | Performed by: INTERNAL MEDICINE

## 2023-09-07 PROCEDURE — 258N000003 HC RX IP 258 OP 636: Performed by: INTERNAL MEDICINE

## 2023-09-07 PROCEDURE — 250N000013 HC RX MED GY IP 250 OP 250 PS 637: Performed by: INTERNAL MEDICINE

## 2023-09-07 PROCEDURE — 96361 HYDRATE IV INFUSION ADD-ON: CPT

## 2023-09-07 PROCEDURE — 84100 ASSAY OF PHOSPHORUS: CPT | Performed by: INTERNAL MEDICINE

## 2023-09-07 PROCEDURE — 36415 COLL VENOUS BLD VENIPUNCTURE: CPT | Performed by: INTERNAL MEDICINE

## 2023-09-07 PROCEDURE — 83735 ASSAY OF MAGNESIUM: CPT | Performed by: INTERNAL MEDICINE

## 2023-09-07 RX ORDER — MULTIPLE VITAMINS W/ MINERALS TAB 9MG-400MCG
1 TAB ORAL DAILY
Status: DISCONTINUED | OUTPATIENT
Start: 2023-09-08 | End: 2023-09-08

## 2023-09-07 RX ORDER — ESCITALOPRAM OXALATE 10 MG/1
10 TABLET ORAL DAILY
Status: DISCONTINUED | OUTPATIENT
Start: 2023-09-07 | End: 2023-09-10 | Stop reason: HOSPADM

## 2023-09-07 RX ORDER — FOLIC ACID 1 MG/1
1 TABLET ORAL DAILY
Status: DISCONTINUED | OUTPATIENT
Start: 2023-09-07 | End: 2023-09-08

## 2023-09-07 RX ORDER — GABAPENTIN 300 MG/1
300 CAPSULE ORAL EVERY 8 HOURS
Status: DISCONTINUED | OUTPATIENT
Start: 2023-09-12 | End: 2023-09-08

## 2023-09-07 RX ORDER — ESCITALOPRAM OXALATE 5 MG/1
5 TABLET ORAL DAILY
Status: ON HOLD | COMMUNITY
End: 2023-09-10

## 2023-09-07 RX ORDER — GABAPENTIN 300 MG/1
900 CAPSULE ORAL EVERY 8 HOURS
Status: DISCONTINUED | OUTPATIENT
Start: 2023-09-07 | End: 2023-09-08

## 2023-09-07 RX ORDER — HALOPERIDOL 5 MG/ML
2.5-5 INJECTION INTRAMUSCULAR EVERY 6 HOURS PRN
Status: DISCONTINUED | OUTPATIENT
Start: 2023-09-07 | End: 2023-09-08

## 2023-09-07 RX ORDER — SODIUM CHLORIDE 9 MG/ML
INJECTION, SOLUTION INTRAVENOUS CONTINUOUS
Status: ACTIVE | OUTPATIENT
Start: 2023-09-07 | End: 2023-09-07

## 2023-09-07 RX ORDER — ACETAMINOPHEN 325 MG/1
650 TABLET ORAL EVERY 6 HOURS PRN
Status: DISCONTINUED | OUTPATIENT
Start: 2023-09-07 | End: 2023-09-10 | Stop reason: HOSPADM

## 2023-09-07 RX ORDER — ESCITALOPRAM OXALATE 5 MG/1
5 TABLET ORAL DAILY
Status: DISCONTINUED | OUTPATIENT
Start: 2023-09-07 | End: 2023-09-10 | Stop reason: HOSPADM

## 2023-09-07 RX ORDER — FLUMAZENIL 0.1 MG/ML
0.2 INJECTION, SOLUTION INTRAVENOUS
Status: DISCONTINUED | OUTPATIENT
Start: 2023-09-07 | End: 2023-09-10 | Stop reason: HOSPADM

## 2023-09-07 RX ORDER — ONDANSETRON 2 MG/ML
4 INJECTION INTRAMUSCULAR; INTRAVENOUS EVERY 6 HOURS PRN
Status: DISCONTINUED | OUTPATIENT
Start: 2023-09-07 | End: 2023-09-10 | Stop reason: HOSPADM

## 2023-09-07 RX ORDER — ACETAMINOPHEN 650 MG/1
650 SUPPOSITORY RECTAL EVERY 6 HOURS PRN
Status: DISCONTINUED | OUTPATIENT
Start: 2023-09-07 | End: 2023-09-10 | Stop reason: HOSPADM

## 2023-09-07 RX ORDER — GABAPENTIN 100 MG/1
100 CAPSULE ORAL EVERY 8 HOURS
Status: DISCONTINUED | OUTPATIENT
Start: 2023-09-14 | End: 2023-09-08

## 2023-09-07 RX ORDER — ATORVASTATIN CALCIUM 10 MG/1
10 TABLET, FILM COATED ORAL DAILY
Status: ON HOLD | COMMUNITY
End: 2023-09-10

## 2023-09-07 RX ORDER — ONDANSETRON 4 MG/1
4 TABLET, ORALLY DISINTEGRATING ORAL EVERY 6 HOURS PRN
Status: DISCONTINUED | OUTPATIENT
Start: 2023-09-07 | End: 2023-09-10 | Stop reason: HOSPADM

## 2023-09-07 RX ORDER — GABAPENTIN 300 MG/1
600 CAPSULE ORAL EVERY 8 HOURS
Status: DISCONTINUED | OUTPATIENT
Start: 2023-09-10 | End: 2023-09-08

## 2023-09-07 RX ORDER — ATORVASTATIN CALCIUM 10 MG/1
10 TABLET, FILM COATED ORAL DAILY
Status: DISCONTINUED | OUTPATIENT
Start: 2023-09-07 | End: 2023-09-10 | Stop reason: HOSPADM

## 2023-09-07 RX ORDER — LORAZEPAM 2 MG/ML
1-2 INJECTION INTRAMUSCULAR EVERY 30 MIN PRN
Status: DISCONTINUED | OUTPATIENT
Start: 2023-09-07 | End: 2023-09-08

## 2023-09-07 RX ORDER — GABAPENTIN 600 MG/1
1200 TABLET ORAL ONCE
Status: COMPLETED | OUTPATIENT
Start: 2023-09-07 | End: 2023-09-07

## 2023-09-07 RX ORDER — OLANZAPINE 5 MG/1
5-10 TABLET, ORALLY DISINTEGRATING ORAL EVERY 6 HOURS PRN
Status: DISCONTINUED | OUTPATIENT
Start: 2023-09-07 | End: 2023-09-08

## 2023-09-07 RX ORDER — LORAZEPAM 1 MG/1
1-2 TABLET ORAL EVERY 30 MIN PRN
Status: DISCONTINUED | OUTPATIENT
Start: 2023-09-07 | End: 2023-09-08

## 2023-09-07 RX ORDER — ESCITALOPRAM OXALATE 10 MG/1
10 TABLET ORAL DAILY
Status: ON HOLD | COMMUNITY
End: 2023-09-10

## 2023-09-07 RX ADMIN — ATORVASTATIN CALCIUM 10 MG: 10 TABLET, FILM COATED ORAL at 12:18

## 2023-09-07 RX ADMIN — GABAPENTIN 900 MG: 300 CAPSULE ORAL at 21:22

## 2023-09-07 RX ADMIN — GABAPENTIN 1200 MG: 600 TABLET, FILM COATED ORAL at 13:33

## 2023-09-07 RX ADMIN — SODIUM CHLORIDE: 9 INJECTION, SOLUTION INTRAVENOUS at 10:26

## 2023-09-07 RX ADMIN — THIAMINE HCL TAB 100 MG 100 MG: 100 TAB at 12:19

## 2023-09-07 RX ADMIN — ESCITALOPRAM 10 MG: 5 TABLET, FILM COATED ORAL at 12:18

## 2023-09-07 RX ADMIN — FOLIC ACID 1 MG: 1 TABLET ORAL at 13:33

## 2023-09-07 RX ADMIN — ESCITALOPRAM OXALATE 5 MG: 5 TABLET, FILM COATED ORAL at 12:21

## 2023-09-07 ASSESSMENT — ACTIVITIES OF DAILY LIVING (ADL)
ADLS_ACUITY_SCORE: 37
ADLS_ACUITY_SCORE: 35
ADLS_ACUITY_SCORE: 37
ADLS_ACUITY_SCORE: 37
ADLS_ACUITY_SCORE: 33
ADLS_ACUITY_SCORE: 37
ADLS_ACUITY_SCORE: 37
ADLS_ACUITY_SCORE: 33
ADLS_ACUITY_SCORE: 35
ADLS_ACUITY_SCORE: 37

## 2023-09-07 NOTE — PHARMACY-ADMISSION MEDICATION HISTORY
Pharmacist Admission Medication History    Admission medication history is complete. The information provided in this note is only as accurate as the sources available at the time of the update.    Medication reconciliation/reorder completed by provider prior to medication history? No    Information Source(s): Patient via in-person    Pertinent Information: none    Changes made to PTA medication list:  Added: atorvastatin, escitalopram  Deleted: albuterol, hydroxyzine, ketoconazole crm, lisinopril, valacyclovir  Changed: None    Medication Affordability:  Not including over the counter (OTC) medications, was there a time in the past 3 months when you did not take your medications as prescribed because of cost?: No    Allergies reviewed with patient and updates made in EHR: no  Medication History Completed By: Nir Nesbitt RPH 9/7/2023 8:48 AM    Prior to Admission medications    Medication Sig Last Dose Taking? Auth Provider Long Term End Date   atorvastatin (LIPITOR) 10 MG tablet Take 10 mg by mouth daily 9/6/2023 at am Yes Unknown, Entered By History Yes    escitalopram (LEXAPRO) 10 MG tablet Take 10 mg by mouth daily Take along with 5 mg tablet for total dose of 15 mg 9/6/2023 at am Yes Unknown, Entered By History Yes    escitalopram (LEXAPRO) 5 MG tablet Take 5 mg by mouth daily Take along with 10 mg tablet for total dose of 15 mg 9/6/2023 at am Yes Unknown, Entered By History Yes    tadalafil (CIALIS) 5 MG tablet Take 5 mg by mouth daily as needed (ED) More than a month Yes Reported, Patient Yes

## 2023-09-07 NOTE — PLAN OF CARE
[facility]    ED Boarding Nurse Handoff Addendum Report:    Date/time: 9/7/2023, 6:40 AM    Activity Level: assist of 1    Fall Risk: Yes:  nonskid shoes/slippers when out of bed, arm band in place, patient and family education, assistive device/personal items within reach, activity supervised, and bedside attendant     Active Infusions: N/A    Current Meds Due: See MAR    Current care needs: See Epic     Oxygen requirements (liters/min and/or FiO2): N/A    Respiratory status: Room air    Vital signs (within last 30 minutes):    Vitals:    09/06/23 2215 09/06/23 2230 09/07/23 0232 09/07/23 0616   BP: (!) 145/92 (!) 160/102 (!) 162/96 (!) 142/89   BP Location:   Right arm Right arm   Pulse: 72 82 89 97   Resp:    18   Temp:   98.2  F (36.8  C) 98.1  F (36.7  C)   TempSrc:   Oral Oral   SpO2:   98% 94%   Weight:           Focused assessment within last 30 minutes:    A&O x3, disoriented to situation. VSS, RA. Up w/ A1 but can be unsteady. Mainly standing up to use bedside urinal. Voiding throughout the night. Denies pain or discomfort. Visibly anxious w/ mild intermittent confusion, sitter at bedside for safety. Pt also has tremor. Face is flushed. Bilateral AC PIVs SL. Plan for SW.     ED Boarding Nurse name: Yulia Da Silva RN

## 2023-09-07 NOTE — H&P
[facility]  Hospitalist Admission Note  Name: Chilango Alfonso Jr.    MRN: [id number]  YOB: 1950    Age: 73 year old  Date of admission: 9/6/2023  Primary care provider: Luis Miguel Gutierrez    Chief Complaint:  confusion    Assessment and Plan:   Acute alcohol intoxication  Possible underlying cognitive impairment: Per report he was found hunched forward in his car at a stoplight somnolent with minimal interaction so he was brought in by EMS.  Some collateral information was able to be provided by a neighbor who recognized the patient, but that person is not here.  Per chart review he had a similar episode about a year ago and he asked his primary care doctor to to be seen by a neurologist at their visit 2 months ago due to concerns for some cognitive impairment.  He is able to tell me he lives alone.  A stroke code was called in the ER with initial CTA head and neck showing possible anterior frontal lobe ischemia and his distal right vertebral artery cannot be seen.  He did received 2 doses of 0.5 mg IV lorazepam to get a brain MRI completed which did not show any acute pathology other than some mild to moderate general brain atrophy.  He has a slight USMAN otherwise CBC, BMP, urinalysis are unremarkable.  Despite blowing 0 on a breathalyzer test his serum ethanol level is quite elevated at 0.25 which would fit with my current history and exam of an acutely intoxicated person.  He says he went to dinner earlier this evening and was driving home from there, but does not remember anything after this.  He was able to tell me who the president was.  He said the year was 1923.  -Observe overnight  -Reassess mental status during the day.  Recommend social work reach out to patient's family as reportedly they had some concerns regarding his cognitive impairment living alone  -Outpatient neurocognitive testing recommended    Mild USMAN: Creatinine 1.33 with most recent creatinine levels 1.1-1.2  "consistent with mild USMAN which is suspect is hypovolemic as he is acutely intoxicated.  -Give 1 L NS now then repeat BMP in the morning    Incidental finding, pulmonary nodule: 6 mm noncalcified left upper lobe pulmonary nodule seen on CTA head and neck  -Repeat CT chest in 6 months is recommended, he should be informed of this tomorrow for follow-up with PCP    MDD  Anxiety: Saw his PCP for increasing anxiety 2 months ago.  At that time he was taking escitalopram 15 mg daily which should be continued if he can confirm he is taking this when he is sober.    BPH: monitor for any retention with bladder scan as needed if not voiding.    HLD: can resume atorvastatin at home if taking.    DVT Prophylaxis: Low Risk/Ambulatory with no VTE prophylaxis indicated  Code Status: Full Code by default, he is not able to have this conversation currently  FEN: Regular diet, 1 L NS  Discharge Dispo: States he lives alone.  Per report family concerned and have counseled MSW to reach out to them  Estimated Disch Date / # of Days until Disch: Admit to observation and reassess mental status in the morning.      History of Present Illness:  Chilango Alfonso Jr. is a 73 year old male with PMH including MDD, anxiety, HLD, and BPH who presents via EMS for confusion.  He was reportedly brought in after being found hunched forward in his car at a stoplight and did not move for 2 cycles of the stoplight.  Reportedly blew 0's on a breathalyzer.  He was somewhat somnolent and very confused so he was brought in for evaluation.  The patient cannot tell me that he was driving home from dinner earlier this evening.  I asked him how much alcohol he had to drink any was not sure about this.  He was able to tell me the name of the restaurant.  When I asked him who he was at the restaurant with he said \" the people there.\"  He was able to tell me the president's name is Ryan.  He said the year was 1923 when asked.  He was very surprised about being " found confused and slumped forward in his car.  He denies something like this ever happening before.  He asked if the police were involved and I said I did not know.  He denies any headache, fever, chills, nausea, vomiting, chest pain, abdominal pain, dysuria.    History obtained from patient, medical record, and from Dr. Russell in the emergency department.  Blood pressure 129/82, heart rate 78, temperature 97.6, oxygen 98% on room air.  Stroke code was called in the ER.  Noncontrast head CT and CTA head and neck showed possible anterior right frontal lobe ischemia versus infarct in his distal right vertebral artery could not be visualized.  He received 0.5 mg IV lorazepam x2 to get a brain MRI completed which showed mild to moderate general brain atrophy, but no acute CVA or other pathology.  CBC and urinalysis are normal.  Creatinine is 1.33 up slightly from baseline otherwise electrolytes are normal.  Ethanol level came back at 0.25 which likely explains some of his acute confusion.  Per report family have some concern regarding baseline cognitive impairment and him living alone.  Observation bed has been requested until he is sober and mental status can be reevaluated along with his living situation.       Clinically Significant Risk Factors Present on Admission                  # Hypertension: Noted on problem list          # Asthma: noted on problem list                  Past Medical History reviewed:  Anxiety  MDD  HLD  BPH  Diverticulitis    Past Surgical History reviewed:  Inguinal hernia repair    Social History reviewed:  Denies smoking  He does admit to drinking some alcohol    Family History reviewed:  Per chart review mother had dementia, lived to 90.  Father also had dementia and lived to 89.    Allergies:  Allergies   Allergen Reactions    Simvastatin Muscle Pain (Myalgia)     Medications Per outside chart:  Escitalopram 15 mg daily and atorvastatin 10 mg at bedtime    Review of Systems:  Limited  due to acute intoxication.  See HPI for details     Physical Exam:  Blood pressure (!) 160/102, pulse 82, temperature 97.6  F (36.4  C), temperature source Oral, resp. rate 18, weight 83 kg (182 lb 15.7 oz), SpO2 98 %.  Wt Readings from Last 1 Encounters:   09/06/23 83 kg (182 lb 15.7 oz)     Exam:  Constitutional: NAD  Eyes: sclera white  HEENT: Dry mucous membranes.  Facial flushing  Respiratory: no respiratory distress, lungs cta bilaterally, no crackles or wheeze  Cardiovascular: RRR.  No murmur  GI: non-tender, not distended, bowel sounds present  Skin: Facial flushing, no rash/abrasion/laceration  Musculoskeletal/extremities: atraumatic, no major deformities. No lower extremity edema  Neurologic: Asleep initially.  Woke easily to voice.  Did not know he was in the ER.  Told me the year was 1923.  He was able to state who the president was.  Strength equal in all extremities and light touch sensation intact  Psychiatric: calm     Lab and imaging data personally reviewed:  Labs:  Recent Labs   Lab 09/06/23  1826   WBC 10.2   HGB 13.6   HCT 40.2   MCV 99        Recent Labs   Lab 09/06/23 1826      POTASSIUM 3.8   CHLORIDE 101   CO2 22   ANIONGAP 13   GLC 82   BUN 23.8*   CR 1.33*   GFRESTIMATED 56*   ELEONORA 9.2     Recent Labs   Lab 09/06/23 1826   INR 1.03     Recent Labs   Lab 09/06/23  1921   COLOR Straw   APPEARANCE Clear   URINEGLC Negative   URINEBILI Negative   URINEKETONE Negative   SG 1.018   UBLD Negative   URINEPH 5.0   PROTEIN Negative   NITRITE Negative   LEUKEST Negative   RBCU <1   WBCU <1     Troponin T 12  Ethanol level 0.25    EKG: NSR    Imaging:  Recent Results (from the past 24 hour(s))   CT Head w/o Contrast    Narrative    EXAM: CT HEAD W/O CONTRAST, CTA HEAD NECK W CONTRAST  LOCATION: [facility]  DATE: 9/6/2023    INDICATION: Altered mental status. Code Stroke to evaluate for potential thrombolysis and thrombectomy. PLEASE READ IMMEDIATELY.  COMPARISON:  None.  CONTRAST: 75mL Isovue 370  TECHNIQUE: Head and neck CT angiogram with IV contrast. Noncontrast head CT followed by axial helical CT images of the head and neck vessels obtained during the arterial phase of intravenous contrast administration. Axial 2D reconstructed images and   multiplanar 3D MIP reconstructed images of the head and neck vessels were performed by the technologist. Dose reduction techniques were used. All stenosis measurements made according to NASCET criteria unless otherwise specified.    FINDINGS:   NONCONTRAST HEAD CT:   INTRACRANIAL CONTENTS: No intracranial hemorrhage, extraaxial collection, or mass effect.  Question subtle hypodensity and loss of gray-white differentiation at the inferior anterior right frontal lobe, artifact versus subacute ischemia. Mild presumed   chronic small vessel ischemic changes. Mild generalized volume loss. No hydrocephalus.     VISUALIZED ORBITS/SINUSES/MASTOIDS: No intraorbital abnormality. No paranasal sinus mucosal disease. No middle ear or mastoid effusion.    BONES/SOFT TISSUES: No acute abnormality.    HEAD CTA:  ANTERIOR CIRCULATION: No stenosis/occlusion, aneurysm, or high flow vascular malformation. There is nonstenotic atherosclerotic calcification of bilateral carotid siphons. Fetal origin of both posterior cerebral arteries from the anterior circulation.    POSTERIOR CIRCULATION: No stenosis/occlusion, aneurysm, or high flow vascular malformation. Congenitally small vertebrobasilar system.     DURAL VENOUS SINUSES: Not well evaluated on a technical basis.    NECK CTA:  RIGHT CAROTID: Atherosclerotic plaque results in less than 50% stenosis in the right ICA. No dissection.    LEFT CAROTID: Atherosclerotic plaque results in less than 50% stenosis in the left ICA. No dissection.    VERTEBRAL ARTERIES: The left vertebral artery is widely patent. There is poor visualization of the V4 and V3 segments right vertebral artery which may be secondary to  congenital variant anatomy versus less likely occlusion. Dominant left and smaller   right vertebral arteries.    AORTIC ARCH: Classic aortic arch anatomy with no significant stenosis at the origin of the great vessels.    NONVASCULAR STRUCTURES: 6 mm noncalcified left upper lobe pulmonary nodule.      Impression    IMPRESSION:   HEAD CT:  1.  Apparent focal hypodensity with question loss of gray-white differentiation at the anterior inferior right frontal lobe, subacute ischemia versus artifact.  2.  No evidence of intracranial hemorrhage.    HEAD CTA:   1.  No significant stenosis, aneurysm, or high flow vascular malformation identified.  2.  Variant [place] anatomy as above.    NECK CTA:  1.  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  2.  Otherwise unremarkable CTA of the neck.  3.  6 mm noncalcified left upper lobe pulmonary nodule. Recommend follow-up chest CT in 6 months.    4.  Dr. Foster discussed the above findings by telephone with Dr. Russell at 7:09 PM 09/06/2023.   CTA Head Neck with Contrast    Narrative    EXAM: CT HEAD W/O CONTRAST, CTA HEAD NECK W CONTRAST  LOCATION: [facility]  DATE: 9/6/2023    INDICATION: Altered mental status. Code Stroke to evaluate for potential thrombolysis and thrombectomy. PLEASE READ IMMEDIATELY.  COMPARISON: None.  CONTRAST: 75mL Isovue 370  TECHNIQUE: Head and neck CT angiogram with IV contrast. Noncontrast head CT followed by axial helical CT images of the head and neck vessels obtained during the arterial phase of intravenous contrast administration. Axial 2D reconstructed images and   multiplanar 3D MIP reconstructed images of the head and neck vessels were performed by the technologist. Dose reduction techniques were used. All stenosis measurements made according to NASCET criteria unless otherwise specified.    FINDINGS:   NONCONTRAST HEAD CT:   INTRACRANIAL CONTENTS: No  intracranial hemorrhage, extraaxial collection, or mass effect.  Question subtle hypodensity and loss of gray-white differentiation at the inferior anterior right frontal lobe, artifact versus subacute ischemia. Mild presumed   chronic small vessel ischemic changes. Mild generalized volume loss. No hydrocephalus.     VISUALIZED ORBITS/SINUSES/MASTOIDS: No intraorbital abnormality. No paranasal sinus mucosal disease. No middle ear or mastoid effusion.    BONES/SOFT TISSUES: No acute abnormality.    HEAD CTA:  ANTERIOR CIRCULATION: No stenosis/occlusion, aneurysm, or high flow vascular malformation. There is nonstenotic atherosclerotic calcification of bilateral carotid siphons. Fetal origin of both posterior cerebral arteries from the anterior circulation.    POSTERIOR CIRCULATION: No stenosis/occlusion, aneurysm, or high flow vascular malformation. Congenitally small vertebrobasilar system.     DURAL VENOUS SINUSES: Not well evaluated on a technical basis.    NECK CTA:  RIGHT CAROTID: Atherosclerotic plaque results in less than 50% stenosis in the right ICA. No dissection.    LEFT CAROTID: Atherosclerotic plaque results in less than 50% stenosis in the left ICA. No dissection.    VERTEBRAL ARTERIES: The left vertebral artery is widely patent. There is poor visualization of the V4 and V3 segments right vertebral artery which may be secondary to congenital variant anatomy versus less likely occlusion. Dominant left and smaller   right vertebral arteries.    AORTIC ARCH: Classic aortic arch anatomy with no significant stenosis at the origin of the great vessels.    NONVASCULAR STRUCTURES: 6 mm noncalcified left upper lobe pulmonary nodule.      Impression    IMPRESSION:   HEAD CT:  1.  Apparent focal hypodensity with question loss of gray-white differentiation at the anterior inferior right frontal lobe, subacute ischemia versus artifact.  2.  No evidence of intracranial hemorrhage.    HEAD CTA:   1.  No significant  stenosis, aneurysm, or high flow vascular malformation identified.  2.  Variant [place] anatomy as above.    NECK CTA:  1.  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  2.  Otherwise unremarkable CTA of the neck.  3.  6 mm noncalcified left upper lobe pulmonary nodule. Recommend follow-up chest CT in 6 months.    4.  Dr. Foster discussed the above findings by telephone with Dr. Russell at 7:09 PM 09/06/2023.   MR Brain w/o Contrast    Narrative    EXAM: MR BRAIN W/O CONTRAST  LOCATION: [facility]  DATE: 9/6/2023    INDICATION: confusion  COMPARISON: CTA head and neck on the same date  TECHNIQUE: Routine multiplanar multisequence head MRI without intravenous contrast.    FINDINGS:  INTRACRANIAL CONTENTS: No acute or subacute infarct. No mass, acute hemorrhage, or extra-axial fluid collections. Patchy nonspecific T2/FLAIR hyperintensities within the cerebral white matter most consistent with mild to moderate chronic microvascular   ischemic change. Mild to moderate generalized cerebral atrophy. No hydrocephalus. Normal position of the cerebellar tonsils.     SELLA: No abnormality accounting for technique.    OSSEOUS STRUCTURES/SOFT TISSUES: Normal marrow signal. The major intracranial vascular flow voids are maintained.     ORBITS: No abnormality accounting for technique.     SINUSES/MASTOIDS: No paranasal sinus mucosal disease. No middle ear or mastoid effusion.       Impression    IMPRESSION:  1.  No acute findings.   2.  Age-related changes.         Joaquim Mercer MD  Hospitalist  [facility]

## 2023-09-07 NOTE — PLAN OF CARE
"Neurology update:  Reviewed MRI brain with and without contrast results.  No evidence of acute ischemic stroke.  By ED physician patient continues to be confused.  Alcohol level of 0.25.  Recommend adding U tox screen as well.  Differentials include infectious/toxic/metabolic encephalopathy.  Will defer to primary team for work-up.  If no obvious etiology and patient continues to be symptomatic and consider general neurology consultation for further work-up.    Stroke Neurology team will sign off. Please feel free to reach out with any further questions or concerns.    Rebecca Walker MD  Vascular Neurology Fellow     To page me or covering stroke neurology team member, click here: [facility]  Choose \"On Call\" tab at top, then select \"NEUROLOGY/ALL SITES\" from middle drop-down box, press Enter, then look for \"stroke\" or \"telestroke\" for your site.    "
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
## 2023-09-07 NOTE — PROGRESS NOTES
Pt is disoriented to time, place and situation. Pt looks flushed on their face. Pt observed trembling and reaching for things while on their sleep. CIWA score 6. Sister at the bedside. Pt on a regular diet and tolerating it without nausea. Pt denies pain. Sitter at the bedside. Will cont to monitor and provide cares.

## 2023-09-07 NOTE — PROGRESS NOTES
ROOM # 230    Living Situation (if not independent, order SW consult):  Facility name:  : [facility]    Activity level at baseline: Independent  Activity level on admit: Ax1    Who will be transporting you at discharge: Sister? She flew in from [place] but unsure how long she'll be here. She's a retired RN.    Patient registered to observation; given Patient Bill of Rights; given the opportunity to ask questions about observation status and their plan of care.  Patient has been oriented to the observation room, bathroom and call light is in place.    Discussed discharge goals and expectations with patient/family.

## 2023-09-07 NOTE — PLAN OF CARE
Goal Outcome Evaluation: Pt on CIWA. Very confused and tremulous. Not aggressive. Impulsive but sitter at bedside and up with 1 assist to ambulate to bathroom. Redirects well. CIWA score 19 but holding ativan for now after speaking with doc as will begin with gabapentin as perhaps not withdrawal at this time but other confusion.

## 2023-09-07 NOTE — PROGRESS NOTES
Care Management Follow Up    Length of Stay (days): 0    Additional Information:  SW called daughter to complete MOON. LVM requesting a call back.     YASMIN Smith, LGSW  Emergency Room   Please contact the [facility] on the floor in which the patient is staying for any questions or concerns
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
## 2023-09-08 ENCOUNTER — APPOINTMENT (OUTPATIENT)
Dept: PHYSICAL THERAPY | Facility: CLINIC | Age: 73
End: 2023-09-08
Attending: INTERNAL MEDICINE
Payer: MEDICARE

## 2023-09-08 ENCOUNTER — APPOINTMENT (OUTPATIENT)
Dept: OCCUPATIONAL THERAPY | Facility: CLINIC | Age: 73
End: 2023-09-08
Attending: INTERNAL MEDICINE
Payer: MEDICARE

## 2023-09-08 LAB
ALBUMIN SERPL BCG-MCNC: 3.7 G/DL (ref 3.5–5.2)
ALP SERPL-CCNC: 38 U/L (ref 40–129)
ALT SERPL W P-5'-P-CCNC: 17 U/L (ref 0–70)
ANION GAP SERPL CALCULATED.3IONS-SCNC: 7 MMOL/L (ref 7–15)
AST SERPL W P-5'-P-CCNC: 21 U/L (ref 0–45)
BASOPHILS # BLD AUTO: 0.1 10E3/UL (ref 0–0.2)
BASOPHILS NFR BLD AUTO: 1 %
BILIRUB SERPL-MCNC: 1.1 MG/DL
BUN SERPL-MCNC: 21.5 MG/DL (ref 8–23)
CALCIUM SERPL-MCNC: 8.7 MG/DL (ref 8.8–10.2)
CHLORIDE SERPL-SCNC: 108 MMOL/L (ref 98–107)
CREAT SERPL-MCNC: 1.15 MG/DL (ref 0.67–1.17)
DEPRECATED HCO3 PLAS-SCNC: 25 MMOL/L (ref 22–29)
EGFRCR SERPLBLD CKD-EPI 2021: 66 ML/MIN/1.73M2
EOSINOPHIL # BLD AUTO: 0.2 10E3/UL (ref 0–0.7)
EOSINOPHIL NFR BLD AUTO: 2 %
ERYTHROCYTE [DISTWIDTH] IN BLOOD BY AUTOMATED COUNT: 12.9 % (ref 10–15)
GLUCOSE SERPL-MCNC: 103 MG/DL (ref 70–99)
HCT VFR BLD AUTO: 36.8 % (ref 40–53)
HGB BLD-MCNC: 12.6 G/DL (ref 13.3–17.7)
IMM GRANULOCYTES # BLD: 0.1 10E3/UL
IMM GRANULOCYTES NFR BLD: 1 %
LYMPHOCYTES # BLD AUTO: 1.4 10E3/UL (ref 0.8–5.3)
LYMPHOCYTES NFR BLD AUTO: 16 %
MCH RBC QN AUTO: 33.4 PG (ref 26.5–33)
MCHC RBC AUTO-ENTMCNC: 34.2 G/DL (ref 31.5–36.5)
MCV RBC AUTO: 98 FL (ref 78–100)
MONOCYTES # BLD AUTO: 0.8 10E3/UL (ref 0–1.3)
MONOCYTES NFR BLD AUTO: 9 %
NEUTROPHILS # BLD AUTO: 6.3 10E3/UL (ref 1.6–8.3)
NEUTROPHILS NFR BLD AUTO: 71 %
NRBC # BLD AUTO: 0 10E3/UL
NRBC BLD AUTO-RTO: 0 /100
PLATELET # BLD AUTO: 191 10E3/UL (ref 150–450)
POTASSIUM SERPL-SCNC: 3.8 MMOL/L (ref 3.4–5.3)
PROT SERPL-MCNC: 5.3 G/DL (ref 6.4–8.3)
RBC # BLD AUTO: 3.77 10E6/UL (ref 4.4–5.9)
SODIUM SERPL-SCNC: 140 MMOL/L (ref 136–145)
WBC # BLD AUTO: 8.8 10E3/UL (ref 4–11)

## 2023-09-08 PROCEDURE — 97165 OT EVAL LOW COMPLEX 30 MIN: CPT | Mod: GO

## 2023-09-08 PROCEDURE — G0378 HOSPITAL OBSERVATION PER HR: HCPCS

## 2023-09-08 PROCEDURE — 85025 COMPLETE CBC W/AUTO DIFF WBC: CPT | Performed by: INTERNAL MEDICINE

## 2023-09-08 PROCEDURE — 97116 GAIT TRAINING THERAPY: CPT | Mod: GP

## 2023-09-08 PROCEDURE — 97161 PT EVAL LOW COMPLEX 20 MIN: CPT | Mod: GP

## 2023-09-08 PROCEDURE — 80053 COMPREHEN METABOLIC PANEL: CPT | Performed by: INTERNAL MEDICINE

## 2023-09-08 PROCEDURE — 36415 COLL VENOUS BLD VENIPUNCTURE: CPT | Performed by: INTERNAL MEDICINE

## 2023-09-08 PROCEDURE — 99232 SBSQ HOSP IP/OBS MODERATE 35: CPT | Performed by: HOSPITALIST

## 2023-09-08 PROCEDURE — 97530 THERAPEUTIC ACTIVITIES: CPT | Mod: GO

## 2023-09-08 PROCEDURE — 250N000013 HC RX MED GY IP 250 OP 250 PS 637: Performed by: INTERNAL MEDICINE

## 2023-09-08 RX ADMIN — ESCITALOPRAM OXALATE 5 MG: 5 TABLET, FILM COATED ORAL at 08:14

## 2023-09-08 RX ADMIN — GABAPENTIN 900 MG: 300 CAPSULE ORAL at 05:14

## 2023-09-08 RX ADMIN — FOLIC ACID 1 MG: 1 TABLET ORAL at 08:14

## 2023-09-08 RX ADMIN — MULTIPLE VITAMINS W/ MINERALS TAB 1 TABLET: TAB at 08:14

## 2023-09-08 RX ADMIN — THIAMINE HCL TAB 100 MG 100 MG: 100 TAB at 08:14

## 2023-09-08 RX ADMIN — ESCITALOPRAM 10 MG: 5 TABLET, FILM COATED ORAL at 08:14

## 2023-09-08 RX ADMIN — ATORVASTATIN CALCIUM 10 MG: 10 TABLET, FILM COATED ORAL at 08:14

## 2023-09-08 ASSESSMENT — ACTIVITIES OF DAILY LIVING (ADL)
DEPENDENT_IADLS:: INDEPENDENT
ADLS_ACUITY_SCORE: 37
ADLS_ACUITY_SCORE: 34
ADLS_ACUITY_SCORE: 37
ADLS_ACUITY_SCORE: 34
ADLS_ACUITY_SCORE: 37
ADLS_ACUITY_SCORE: 34
ADLS_ACUITY_SCORE: 37

## 2023-09-08 NOTE — PLAN OF CARE
PRIMARY DIAGNOSIS: Confusion   OUTPATIENT/OBSERVATION GOALS TO BE MET BEFORE DISCHARGE:  ADLs back to baseline: No    Pain status: Pain free.    Return to near baseline physical activity: No     Discharge Planner Nurse   Safe discharge environment identified: No  Barriers to discharge: Yes       Entered by: Radha Camacho RN 09/07/2023 10:26 PM    Vitals are Temp: 98.3  F (36.8  C) Temp src: Oral BP: (!) 158/81 Pulse: 84   Resp: 20 SpO2: 98 %.  Patient is Disorientated x4, pt is able to say they are at the hospital not sure where. They are 1 Assist with Gait Belt and Walker.  Pt is a Regular diet.  They are denying pain.  Patient is Saline, right PIV. Pt has sitter at bedside        Please review provider order for any additional goals.   Nurse to notify provider when observation goals have been met and patient is ready for discharge.
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
## 2023-09-08 NOTE — UTILIZATION REVIEW
Concurrent stay review; Secondary Review Determination - [facility]        Under the authority of the Utilization Management Committee, the utilization review process indicated a secondary review on the above patient.  The review outcome is based on review of the medical records, discussions with staff, and applying clinical experience noted on the date of the review.        (x) Observation/outpatient Status Appropriate - Concurrent stay review       RATIONALE FOR DETERMINATION:   73-year-old male with a history of depression, anxiety, hyperlipidemia (HLP), and benign prostatic hyperplasia (BPH) was admitted on 9/6/2023 after being found unresponsive in his car with a blood alcohol level (BAL) of 0.25. Stroke work-up yielded negative results. The unresponsiveness episode appears to be alcohol-related, and withdrawal symptoms are not expected due to his non-daily drinking history. The patient has intermittent tremors, possibly related to anxiety or an undiagnosed neurological disorder. Notably, he exhibits ongoing memory loss, likely indicative of progressive dementia, supported by a previous PCP visit and a family history of dementia. Occupational therapy (OT) evaluation is pending, and physical therapy (PT) assessment is planned. It's essential to consider his fall risk due to multiple stairs in his home when determining the discharge plan.  Patient delayed discharge is related to disposition, there is no medical necessity for inpatient admission at the time of this review. If there is a change in patient status, please resend for review.    The information on this document is developed by the utilization review team in order for the business office to ensure compliance.  This only denotes the appropriateness of proper admission status and does not reflect the quality of care rendered.       The definitions of Inpatient Status and Observation Status used in making the determination  above are those provided in the CMS Coverage Manual, Chapter 1 and Chapter 6, section 70.4.       Sincerely,    Byron Winston MD

## 2023-09-08 NOTE — PLAN OF CARE
PRIMARY DIAGNOSIS: Altered Mental Status  OUTPATIENT/OBSERVATION GOALS TO BE MET BEFORE DISCHARGE:  ADLs back to baseline: Yes    Activity and level of assistance: Up with standby assistance.    Pain status: Pain free.    Return to near baseline physical activity: Yes     Discharge Planner Nurse   Safe discharge environment identified: No  Barriers to discharge: Yes       Entered by: Vale Huntley RN 09/08/2023 1:09 PM    Pt A/O x2, confusion to time and place. SBA with a gait belt, has walked already several times around the unit with PSC. CIWAs Q4H, score of 3 this morning. PSC at bedside for safety. PIV x2 SL. Regular diet, tolerating. VSS.  1200 addendum: All alcohol withdrawal meds discontinued, CIWAs discontinued. Eating lunch. Sister in the room.  Vital signs:  Temp: 97.6  F (36.4  C) Temp src: Oral BP: 106/69 Pulse: 93   Resp: 20 SpO2: 96 % O2 Device: None (Room air)     Weight: 83 kg (182 lb 15.7 oz)  There is no height or weight on file to calculate BMI.    Please review provider order for any additional goals.   Nurse to notify provider when observation goals have been met and patient is ready for discharge.

## 2023-09-08 NOTE — PROGRESS NOTES
09/08/23 1526   Appointment Info   Signing Clinician's Name / Credentials (PT) Laurie Acevedo DPT   Living Environment   People in Home alone   Current Living Arrangements house  ([facility])   Number of Stairs, Main Entrance 4   Stair Railings, Main Entrance railings safe and in good condition   Number of Stairs, Within Home, Primary greater than 10 stairs   Stair Railings, Within Home, Primary railings safe and in good condition   Transportation Anticipated car, drives self   Living Environment Comments Pt was independent with ADLs and IADLs at baseline, was driving himself.   Self-Care   Usual Activity Tolerance good   Current Activity Tolerance good   Equipment Currently Used at Home none   Fall history within last six months no  (none in past 6 months, but 2 last year)   General Information   Onset of Illness/Injury or Date of Surgery 09/06/23   Referring Physician Kvng Coelho MD, MD   Patient/Family Therapy Goals Statement (PT) return home   Pertinent History of Current Problem (include personal factors and/or comorbidities that impact the POC) Chilango Alfonso Jr. is a 73 year old male with history of depression, anxiety, HLP, BPH, likely ongoing memory loss, admitted on 9/6/2023 after being found unresponsive in his car.   Existing Precautions/Restrictions fall   Cognition   Orientation Status (Cognition) oriented x 3   Follows Commands (Cognition) WFL   Safety Deficit (Cognition) impulsivity;awareness of need for assistance;insight into deficits/self-awareness   Pain Assessment   Patient Currently in Pain No   Integumentary/Edema   Integumentary/Edema Comments normal aging changes   Posture    Posture Forward head position;Protracted shoulders   Range of Motion (ROM)   Range of Motion ROM is WFL   Strength (Manual Muscle Testing)   Strength (Manual Muscle Testing) Deficits observed during functional mobility   Bed Mobility   Comment, (Bed Mobility) not observed at eval   Transfers   Comment,  (Transfers) SBA sit<>stand   Gait/Stairs (Locomotion)   Distance in Feet 10'   Comment, (Gait/Stairs) CGA ambulation and stairs   Balance   Balance Comments unsteady, had minor LOB through session   Clinical Impression   Criteria for Skilled Therapeutic Intervention Yes, treatment indicated   PT Diagnosis (PT) impaired mobility   Influenced by the following impairments cognitive status, impaired balance, weakness   Functional limitations due to impairments impaired bed mobility, transfers, ambulation, stairs   Clinical Presentation (PT Evaluation Complexity) Stable/Uncomplicated   Clinical Presentation Rationale clinical judgement, chart review   Clinical Decision Making (Complexity) low complexity   Planned Therapy Interventions (PT) balance training;bed mobility training;gait training;home exercise program;neuromuscular re-education;patient/family education;stair training;strengthening;transfer training;progressive activity/exercise   Risk & Benefits of therapy have been explained evaluation/treatment results reviewed;care plan/treatment goals reviewed;risks/benefits reviewed;current/potential barriers reviewed;participants voiced agreement with care plan;participants included;patient   PT Total Evaluation Time   PT Eval, Low Complexity Minutes (10226) 5   Physical Therapy Goals   PT Frequency Daily   PT Predicted Duration/Target Date for Goal Attainment 09/11/23   PT Goals Bed Mobility;Transfers;Gait;Stairs   PT: Bed Mobility Independent;Supine to/from sit   PT: Transfers Independent;Sit to/from stand   PT: Gait Independent;150 feet   PT: Stairs Independent;Greater than 10 stairs;Rail on right   Interventions   Interventions Quick Adds Neuromuscular Re-ed;Gait Training;Therapeutic Activity   Therapeutic Activity   Therapeutic Activities: dynamic activities to improve functional performance Minutes (40470) 3   Gait Training   Gait Training Minutes (45070) 9   Neuromuscular Re-education   Neuromuscular Re-Education  Minutes (00762) 8   PT Discharge Planning   PT Plan balance training, progress independence with ambulation and stairs   PT Discharge Recommendation (DC Rec) Long term care facility   PT Rationale for DC Rec Pt's main limitations are cognition and balance. Pt is able to ambulate + feet and navigate stairs with assist. Pt is currently living alone without any family nearby that could stay with him. Recommend a more supportive living environment, such as correction, to support pt's declining cognition and mobility deficits.   PT Brief overview of current status SBA/CGA   Total Session Time   Timed Code Treatment Minutes 20   Total Session Time (sum of timed and untimed services) 25

## 2023-09-08 NOTE — PLAN OF CARE
PRIMARY DIAGNOSIS: Altered Mental Status  OUTPATIENT/OBSERVATION GOALS TO BE MET BEFORE DISCHARGE:  ADLs back to baseline: Yes    Activity and level of assistance: Up with standby assistance.    Pain status: Pain free.    Return to near baseline physical activity: Yes     Discharge Planner Nurse   Safe discharge environment identified: No  Barriers to discharge: Yes       Entered by: Vale Huntley RN 09/08/2023 1:02 PM    Pt A/O x2, confusion to time and place. SBA with a gait belt, has walked already several times around the unit with PSC. CIWAs Q4H, score of 3 this morning. PSC at bedside for safety. PIV x2 SL. Regular diet, tolerating. VSS.  Vital signs:  Temp: 97.6  F (36.4  C) Temp src: Oral BP: 106/69 Pulse: 93   Resp: 20 SpO2: 96 % O2 Device: None (Room air)     Weight: 83 kg (182 lb 15.7 oz)  There is no height or weight on file to calculate BMI.    Please review provider order for any additional goals.   Nurse to notify provider when observation goals have been met and patient is ready for discharge.

## 2023-09-08 NOTE — PROGRESS NOTES
[facility]    Medicine Progress Note - Hospitalist Service    Date of Admission:  9/6/2023    Assessment & Plan   Chilango Alfonso Jr. is a 73 year old male with history of depression, anxiety, HLP, BPH, likely ongoing memory loss, admitted on 9/6/2023 after being found unresponsive in his car.  Found to have a BAL of 0.25  Stroke work-up negative.  Likely has ongoing/progressive dementia    Unresponsive episode    - this was likely due to his alcohol use     - stroke work-up negative    Question of alcohol withdrawal    - patient is not a daily drinker and has never been through withdrawal    - discontinue CIWA and all meds     Tremors    - patient having intermittent tremors    - possibly due to anxiety versus underlying undiagnosed neurological disorder    - currently does not have tremors    Memory loss    - likely has undiagnosed dementia    - saw his PCP 7/7/2023 with complaints about his memory: declined Neurocognitive testing, but was referred to Neurology (appt at the end of the month)    - daughter is his POA and had already taken over his finances because he was unable to manage them himself    - his mom had dementia    - clearly his dementia has been ongoing and progressive    - OT eval pending: dispo will be based on this assessment    - PT to see after OT (of note patient has been walking in the halls but appears like he may be a fall risk on stairs, which he has many of in his home)    HLP    - cont his Lipitor    Depression/Anxiety    - cont Lexapro    Sister (from [facility]) in room. Daughter was on speaker phone for my visit    Addendum: spoke with OT (see note). Updated daughter (left voicemail). Updated sister in person     Diet: Regular Diet Adult    DVT Prophylaxis: Low Risk/Ambulatory with no VTE prophylaxis indicated (he has been walking in the halls)  Pillai Catheter: Not present  Lines: None     Cardiac Monitoring: None  Code Status: Full Code      Clinically  Significant Risk Factors Present on Admission                  # Hypertension: Noted on problem list          # Asthma: noted on problem list        Disposition Plan           Monty Rmoero MD  Hospitalist Service  [facility]  Securely message with [employer] (more info)  Text page via [employer] Paging/Directory   ______________________________________________________________________    Interval History   I assumed care of the patient.  I reviewed his chart and his notes.  I sat with him and his sister in the room.  I reviewed his history.  Patient is still not able to tell me much about why he is in the hospital.  He knows that EMS brought him in.  He knows he was in his car.  He does not know why he was found passed out.  I did explain to him that he had an elevated blood alcohol level.  He denies drinking daily.  He has never had withdrawal symptoms.  He knows he is in the hospital, but thinks he is in [place].  He can tell me what city he lives in.  He knows the year and the president.  He denies any physical symptoms today.  He is eating and drinking.  He is walking.  According to his family, he has had some memory issues.  His daughter is his power of  and took over his finances sometime ago because he was unable to manage them.  His daughter and his son both live in [place].  His sister lives in [facility].    Physical Exam   Vital Signs: Temp: 97.6  F (36.4  C) Temp src: Oral BP: 106/69 Pulse: 93   Resp: 20 SpO2: 96 % O2 Device: None (Room air)    Weight: 182 lbs 15.71 oz    Constitutional: awake, alert, cooperative, no apparent distress, and appears stated age  Eyes: Lids and lashes normal, pupils equal, round and reactive to light, extra ocular muscles intact, sclera clear, conjunctiva normal  ENT: Normocephalic, without obvious abnormality, atraumatic, sinuses nontender on palpation, external ears without lesions, oral pharynx with moist mucous membranes, tonsils  without erythema or exudates, gums normal and good dentition.  Respiratory: No increased work of breathing, good air exchange, clear to auscultation bilaterally, no crackles or wheezing  Cardiovascular: Normal apical impulse, regular rate and rhythm, normal S1 and S2, no S3 or S4, and no murmur noted  GI: No scars, normal bowel sounds, soft, non-distended, non-tender, no masses palpated, no hepatosplenomegally    Medical Decision Making       45 MINUTES SPENT BY ME on the date of service doing chart review, history, exam, documentation & further activities per the note.      Data     I have personally reviewed the following data over the past 24 hrs:    8.8  \   12.6 (L)   / 191     140 108 (H) 21.5 /  103 (H)   3.8 25 1.15 \     ALT: 17 AST: 21 AP: 38 (L) TBILI: 1.1   ALB: 3.7 TOT PROTEIN: 5.3 (L) LIPASE: N/A       Imaging results reviewed over the past 24 hrs:   No results found for this or any previous visit (from the past 24 hour(s)).

## 2023-09-08 NOTE — PLAN OF CARE
PRIMARY DIAGNOSIS: Confusion  OUTPATIENT/OBSERVATION GOALS TO BE MET BEFORE DISCHARGE:  ADLs back to baseline: No    Pain status: Pain free.    Return to near baseline physical activity: No     Discharge Planner Nurse   Safe discharge environment identified: No  Barriers to discharge: Yes       Entered by: Radha Camacho RN 09/08/2023 2:51 AM    Vitals are Temp: 98.5  F (36.9  C) Temp src: Oral BP: (!) 149/79 Pulse: 89   Resp: 20 SpO2: 97 %.  Patient is Disorientated x4. They are 1 Assist with Gait Belt and Walker.  Pt is a Regular diet.  They are denying pain.  Patient is Saline locked. Pt has been resting in bed, has frequent jerks in bed. When awake patient has slight tremors. Patient's neuros are intact.     Plan- Reassess mental status in the morning. Outpatient neurocognitive testing is recommended. Check BMP in morning. Monitor for retention if not voiding. Will continue to monitor         Please review provider order for any additional goals.   Nurse to notify provider when observation goals have been met and patient is ready for discharge.

## 2023-09-09 PROCEDURE — 250N000011 HC RX IP 250 OP 636: Mod: JZ | Performed by: HOSPITALIST

## 2023-09-09 PROCEDURE — 96374 THER/PROPH/DIAG INJ IV PUSH: CPT | Mod: XS

## 2023-09-09 PROCEDURE — 96375 TX/PRO/DX INJ NEW DRUG ADDON: CPT

## 2023-09-09 PROCEDURE — 99232 SBSQ HOSP IP/OBS MODERATE 35: CPT | Performed by: INTERNAL MEDICINE

## 2023-09-09 PROCEDURE — 250N000011 HC RX IP 250 OP 636: Performed by: INTERNAL MEDICINE

## 2023-09-09 PROCEDURE — G0378 HOSPITAL OBSERVATION PER HR: HCPCS

## 2023-09-09 PROCEDURE — 250N000013 HC RX MED GY IP 250 OP 250 PS 637: Performed by: INTERNAL MEDICINE

## 2023-09-09 PROCEDURE — 96376 TX/PRO/DX INJ SAME DRUG ADON: CPT

## 2023-09-09 RX ORDER — THIAMINE HYDROCHLORIDE 100 MG/ML
500 INJECTION, SOLUTION INTRAMUSCULAR; INTRAVENOUS 3 TIMES DAILY
Status: DISCONTINUED | OUTPATIENT
Start: 2023-09-09 | End: 2023-09-10 | Stop reason: HOSPADM

## 2023-09-09 RX ORDER — THIAMINE HYDROCHLORIDE 100 MG/ML
250 INJECTION, SOLUTION INTRAMUSCULAR; INTRAVENOUS DAILY
Status: DISCONTINUED | OUTPATIENT
Start: 2023-09-11 | End: 2023-09-10 | Stop reason: HOSPADM

## 2023-09-09 RX ORDER — HYDRALAZINE HYDROCHLORIDE 20 MG/ML
10 INJECTION INTRAMUSCULAR; INTRAVENOUS EVERY 6 HOURS PRN
Status: DISCONTINUED | OUTPATIENT
Start: 2023-09-09 | End: 2023-09-10 | Stop reason: HOSPADM

## 2023-09-09 RX ORDER — HALOPERIDOL 5 MG/ML
2 INJECTION INTRAMUSCULAR EVERY 6 HOURS PRN
Status: DISCONTINUED | OUTPATIENT
Start: 2023-09-09 | End: 2023-09-10 | Stop reason: HOSPADM

## 2023-09-09 RX ADMIN — ESCITALOPRAM 10 MG: 5 TABLET, FILM COATED ORAL at 09:04

## 2023-09-09 RX ADMIN — ATORVASTATIN CALCIUM 10 MG: 10 TABLET, FILM COATED ORAL at 09:04

## 2023-09-09 RX ADMIN — ESCITALOPRAM OXALATE 5 MG: 5 TABLET, FILM COATED ORAL at 09:04

## 2023-09-09 RX ADMIN — HYDRALAZINE HYDROCHLORIDE 10 MG: 20 INJECTION, SOLUTION INTRAMUSCULAR; INTRAVENOUS at 04:52

## 2023-09-09 RX ADMIN — THIAMINE HYDROCHLORIDE 500 MG: 100 INJECTION, SOLUTION INTRAMUSCULAR; INTRAVENOUS at 12:04

## 2023-09-09 RX ADMIN — HALOPERIDOL LACTATE 2 MG: 5 INJECTION, SOLUTION INTRAMUSCULAR at 04:48

## 2023-09-09 RX ADMIN — THIAMINE HYDROCHLORIDE 500 MG: 100 INJECTION, SOLUTION INTRAMUSCULAR; INTRAVENOUS at 17:39

## 2023-09-09 ASSESSMENT — ACTIVITIES OF DAILY LIVING (ADL)
ADLS_ACUITY_SCORE: 34
ADLS_ACUITY_SCORE: 31

## 2023-09-09 NOTE — PLAN OF CARE
PRIMARY DIAGNOSIS: Altered Mental Status  OUTPATIENT/OBSERVATION GOALS TO BE MET BEFORE DISCHARGE:  ADLs back to baseline: Yes    Activity and level of assistance: Ambulating independently.    Pain status: Pain free.    Return to near baseline physical activity: Yes     Discharge Planner Nurse   Safe discharge environment identified: Yes  Barriers to discharge: Yes       Entered by: Vale Huntley RN 09/09/2023 1:00 PM    Pt A/O x4 and makes needs known. Does seem to have some confusion at times. Eager to go home, educated to the pt that discharge planning needs to be met prior to discharge. Sister arrived on the unit around 0845. Discussed with her mentation, agressiveness, and that pt was found to be hypertensive overnight and required IV hydralazine and haldol. Per sister, pt's daughter will be flying in [facility] at 1700 to be with pt. Door alarm on for safety. PIV SL x2. Regular diet, good appetite.  Vital signs:  Temp: 97.7  F (36.5  C) Temp src: Oral BP: (!) 163/94 Pulse: 63   Resp: 18 SpO2: 98 % O2 Device: None (Room air)     Weight: 83 kg (182 lb 15.7 oz)  There is no height or weight on file to calculate BMI.    Please review provider order for any additional goals.   Nurse to notify provider when observation goals have been met and patient is ready for discharge.

## 2023-09-09 NOTE — PLAN OF CARE
PRIMARY DIAGNOSIS: Confusion  OUTPATIENT/OBSERVATION GOALS TO BE MET BEFORE DISCHARGE:  1. Pain Status: pt denies     2. Return to near baseline physical activity: Yes    3. Cleared for discharge by consultants (if involved): Yes    Discharge Planner Nurse   Safe discharge environment identified: Yes  Barriers to discharge: No       Entered by: Garrett Nava RN 09/09/2023 4:05 AM     Please review provider order for any additional goals.   Nurse to notify provider when observation goals have been met and patient is ready for discharge.Goal Outcome Evaluation:

## 2023-09-09 NOTE — PLAN OF CARE
PRIMARY DIAGNOSIS: AMS  OUTPATIENT/OBSERVATION GOALS TO BE MET BEFORE DISCHARGE:  ADLs back to baseline: Yes    Activity and level of assistance: Ambulating independently.    Pain status: Pain free.    Return to near baseline physical activity: Yes     Discharge Planner Nurse   Safe discharge environment identified: Yes  Barriers to discharge: No       Entered by: Virgilio Del Rosario RN 09/08/2023     Please review provider order for any additional goals.   Nurse to notify provider when observation goals have been met and patient is ready for discharge.

## 2023-09-09 NOTE — PROGRESS NOTES
Care Management Follow Up    Length of Stay (days): 0    Expected Discharge Date: 09/10/2023     Concerns to be Addressed: all concerns addressed in this encounter     Patient plan of care discussed at interdisciplinary rounds: Yes    Anticipated Discharge Disposition: Home Care     Anticipated Discharge Services: None  Anticipated Discharge DME: None    Patient/family educated on Medicare website which has current facility and service quality ratings: yes  Education Provided on the Discharge Plan:    Patient/Family in Agreement with the Plan: no    Referrals Placed by CM/SW: Homecare    Additional Information:  CM following for discharge planning. Per discussions with provider, pt's dtr Shonda is flying in from [place] today and plans to stay with pt in [place] until she is able to bring pt home with her to [place]. Shonda reports that pt will have family staying with him until he eventually transitions to [facility] in [place].     Pt was accepted by [facility] for home RN/OT upon discharge. If pt/family plans to remain in [place] after discharge will plan for this. If pt travels to his dtr's home within a few days will then cancel HC referral and pt/family will need to establish care in [place] to address his care needs further.     Katina Hope RN BSN   Inpatient Care Coordination  [facility]   Phone [phone]

## 2023-09-09 NOTE — PLAN OF CARE
"PRIMARY DIAGNOSIS: Confusion  OUTPATIENT/OBSERVATION GOALS TO BE MET BEFORE DISCHARGE:  1. Pain Status: pt denies     2. Return to near baseline physical activity: Yes    3. Cleared for discharge by consultants (if involved): Yes    Discharge Planner Nurse   Safe discharge environment identified: Yes  Barriers to discharge: No       Entered by: Garrtet Nava RN 09/09/2023 7:29 AM   Vitals are Temp: 98.2  F (36.8  C) Temp src: Oral BP: (!) 179/98 Pulse: 52   Resp: 18 SpO2: 98 %.  Patient is Alert and Oriented x4. independent with no assistive devices .Pt is a Regular diet.  denying pain.  Patient is Saline locked. Pt 's BP elevated ,  c/o difficult fall in sleep, refused melatonin. stated that feeling anxious about why cannot drive. \"Guy only 73 yrs old, I need freedom\" x-cover paged prn hydralazine given and haldol, BP is 149/67. Patient is calm.    Please review provider order for any additional goals.   Nurse to notify provider when observation goals have been met and patient is ready for discharge.Goal Outcome Evaluation:                        "

## 2023-09-09 NOTE — PROGRESS NOTES
[facility]    Medicine Progress Note - Hospitalist Service    Date of Admission:  9/6/2023    Assessment & Plan   Chilango Alfonso Jr. is a 73 year old male with history of depression, anxiety, HLP, BPH, likely ongoing memory loss, admitted on 9/6/2023 after being found unresponsive in his car.  Found to have a BAL of 0.25  Stroke work-up negative.  Likely has ongoing/progressive dementia    Memory loss  Cognitive deficit, SLUMS 11/30    - Most likely dementia.     - saw his PCP 7/7/2023 with complaints about his memory: declined Neurocognitive testing, but was referred to Neurology (appt at the end of the month)    - daughter is his POA and had already taken over his finances because he was unable to manage them himself    - his mom had dementia    - clearly his dementia has been ongoing and progressive    - OT eval: SLUMS 11/30.  - Spoke to daughter Shonda who is coming tonight. SLUMS 11/30. Patient is not safe for discharge alone. Shonda is hoping to convince patient to come back to [place] and then can look into longterm placement options. Agree with plan.  - Also giving IV thiamine while here in case any contribution of Wernicke's / alcohol injury over time.     Unresponsive episode    - this was likely due to his alcohol use     - stroke work-up negative    Question of alcohol withdrawal    - patient is not a daily drinker and has never been through withdrawal    - discontinue CIWA and all meds     Tremors    - patient having intermittent tremors    - possibly due to anxiety versus underlying undiagnosed neurological disorder    - currently does not have tremors    HLP    - cont his Lipitor    Depression/Anxiety    - cont Lexapro    Updated daughter Shonda by phone.       Diet: Regular Diet Adult    DVT Prophylaxis: Low Risk/Ambulatory with no VTE prophylaxis indicated (he has been walking in the halls)  Pillai Catheter: Not present  Lines: None     Cardiac Monitoring: None  Code Status: Full Code       Clinically Significant Risk Factors Present on Admission                    # Hypertension: Noted on problem list          # Asthma: noted on problem list        Disposition Plan      Expected Discharge Date: 09/10/2023      Destination: home;home with help/services          Nancy Lloyd MD  Hospitalist Service  [facility]  Securely message with [employer] (more info)  Text page via [employer] Paging/Directory   ______________________________________________________________________    Interval History   Spoke to daughter Shonda who is coming tonight. [facility] 11/30. Patient is not safe for discharge alone. Shonda is hoping to convince patient to come back to [place] and then can look into longterm placement options. Agree with plan.    Physical Exam   Vital Signs: Temp: 97.8  F (36.6  C) Temp src: Oral BP: 130/77 Pulse: 73   Resp: 18 SpO2: 97 % O2 Device: None (Room air)    Weight: 182 lbs 15.71 oz    Constitutional: awake, alert, cooperative, no apparent distress, and appears stated age  HEENT: Normocephalic, without obvious abnormality, atraumatic. Moist oral mucosa.  Respiratory: No increased work of breathing. Clear to auscultation bilaterally. No crackles or wheezing.  Cardiovascular:  Regular rate and rhythm  GI: soft, non-distended, non-tender, normoactive bowel sounds  Neurologic: Cognitive deficit noted, but patient able to confabulate and come up with answers whether accurate or not. Normal gait.         Medications      atorvastatin  10 mg Oral Daily    escitalopram  10 mg Oral Daily    escitalopram  5 mg Oral Daily    thiamine  500 mg Intravenous TID    Followed by    [START ON 9/11/2023] thiamine  250 mg Intravenous Daily    Followed by    [START ON 9/16/2023] thiamine  100 mg Oral Daily       Data   Recent Labs   Lab 09/08/23  0609 09/07/23  0758 09/06/23  1826   WBC 8.8 11.2* 10.2   HGB 12.6* 13.7 13.6   MCV 98 97 99    210 224   INR  --   --  1.03    138 136   POTASSIUM 3.8 4.3  3.8   CHLORIDE 108* 103 101   CO2 25 19* 22   BUN 21.5 20.2 23.8*   CR 1.15 1.32* 1.33*   ANIONGAP 7 16* 13   ELEONORA 8.7* 9.1 9.2   * 92 82   ALBUMIN 3.7  --   --    PROTTOTAL 5.3*  --   --    BILITOTAL 1.1  --   --    ALKPHOS 38*  --   --    ALT 17  --   --    AST 21  --   --        No results found for this or any previous visit (from the past 24 hour(s)).

## 2023-09-09 NOTE — PROGRESS NOTES
"Patient's BP is gradually increasing 179/96. R. arm. 159.93. L. arm. pt denies headache. has red eyes, c/o difficult fall in sleep, refused melatonin. stated that he is anxious about why he cannot drive. \"Guy only 73 yrs old, I need freedom\". no prn BP or prn antianxiety on file. Cross cover paged. New order initiated.  "

## 2023-09-09 NOTE — PROGRESS NOTES
PRIMARY DIAGNOSIS: AMS  OUTPATIENT/OBSERVATION GOALS TO BE MET BEFORE DISCHARGE:  ADLs back to baseline: Yes    Activity and level of assistance: Ambulating independently.    Pain status: Pain free.    Return to near baseline physical activity: Yes     Discharge Planner Nurse   Safe discharge environment identified: Yes  Barriers to discharge: No       Entered by: Virgilio Del Rosario RN 09/08/2023   BP (!) 153/90 (BP Location: Left arm)   Pulse 77   Temp 98  F (36.7  C) (Oral)   Resp 20   Wt 83 kg (182 lb 15.7 oz)   SpO2 99%   HTN on RA. AxOx4 and able to make needs known. Tolerating regular diet. Ambulating independently. PIV SL, 2nd PIV removed. Denies pain. Plan to monitor mentation until safe discharge home w/ sister until home care is established. Will continue to provide supportive cares.  Please review provider order for any additional goals.   Nurse to notify provider when observation goals have been met and patient is ready for discharge.

## 2023-09-10 VITALS
TEMPERATURE: 97.6 F | HEART RATE: 79 BPM | DIASTOLIC BLOOD PRESSURE: 65 MMHG | WEIGHT: 182.98 LBS | SYSTOLIC BLOOD PRESSURE: 95 MMHG | RESPIRATION RATE: 16 BRPM | OXYGEN SATURATION: 96 %

## 2023-09-10 LAB
ALBUMIN SERPL BCG-MCNC: 4 G/DL (ref 3.5–5.2)
ALP SERPL-CCNC: 43 U/L (ref 40–129)
ALT SERPL W P-5'-P-CCNC: 25 U/L (ref 0–70)
ANION GAP SERPL CALCULATED.3IONS-SCNC: 9 MMOL/L (ref 7–15)
AST SERPL W P-5'-P-CCNC: 27 U/L (ref 0–45)
BASOPHILS # BLD AUTO: 0.1 10E3/UL (ref 0–0.2)
BASOPHILS NFR BLD AUTO: 1 %
BILIRUB DIRECT SERPL-MCNC: 0.27 MG/DL (ref 0–0.3)
BILIRUB SERPL-MCNC: 1.1 MG/DL
BUN SERPL-MCNC: 20.6 MG/DL (ref 8–23)
CALCIUM SERPL-MCNC: 9.1 MG/DL (ref 8.8–10.2)
CHLORIDE SERPL-SCNC: 99 MMOL/L (ref 98–107)
CREAT SERPL-MCNC: 1.28 MG/DL (ref 0.67–1.17)
DEPRECATED HCO3 PLAS-SCNC: 24 MMOL/L (ref 22–29)
EGFRCR SERPLBLD CKD-EPI 2021: 58 ML/MIN/1.73M2
EOSINOPHIL # BLD AUTO: 0.3 10E3/UL (ref 0–0.7)
EOSINOPHIL NFR BLD AUTO: 4 %
ERYTHROCYTE [DISTWIDTH] IN BLOOD BY AUTOMATED COUNT: 12.4 % (ref 10–15)
GLUCOSE SERPL-MCNC: 98 MG/DL (ref 70–99)
HCT VFR BLD AUTO: 38.1 % (ref 40–53)
HGB BLD-MCNC: 13.1 G/DL (ref 13.3–17.7)
IMM GRANULOCYTES # BLD: 0 10E3/UL
IMM GRANULOCYTES NFR BLD: 0 %
LYMPHOCYTES # BLD AUTO: 1.6 10E3/UL (ref 0.8–5.3)
LYMPHOCYTES NFR BLD AUTO: 25 %
MAGNESIUM SERPL-MCNC: 1.8 MG/DL (ref 1.7–2.3)
MCH RBC QN AUTO: 33.4 PG (ref 26.5–33)
MCHC RBC AUTO-ENTMCNC: 34.4 G/DL (ref 31.5–36.5)
MCV RBC AUTO: 97 FL (ref 78–100)
MONOCYTES # BLD AUTO: 0.7 10E3/UL (ref 0–1.3)
MONOCYTES NFR BLD AUTO: 11 %
NEUTROPHILS # BLD AUTO: 3.9 10E3/UL (ref 1.6–8.3)
NEUTROPHILS NFR BLD AUTO: 59 %
NRBC # BLD AUTO: 0 10E3/UL
NRBC BLD AUTO-RTO: 0 /100
PHOSPHATE SERPL-MCNC: 3.4 MG/DL (ref 2.5–4.5)
PLATELET # BLD AUTO: 203 10E3/UL (ref 150–450)
POTASSIUM SERPL-SCNC: 3.7 MMOL/L (ref 3.4–5.3)
PROT SERPL-MCNC: 5.8 G/DL (ref 6.4–8.3)
RBC # BLD AUTO: 3.92 10E6/UL (ref 4.4–5.9)
SODIUM SERPL-SCNC: 132 MMOL/L (ref 136–145)
WBC # BLD AUTO: 6.6 10E3/UL (ref 4–11)

## 2023-09-10 PROCEDURE — 250N000011 HC RX IP 250 OP 636: Performed by: INTERNAL MEDICINE

## 2023-09-10 PROCEDURE — 80053 COMPREHEN METABOLIC PANEL: CPT | Performed by: INTERNAL MEDICINE

## 2023-09-10 PROCEDURE — 99239 HOSP IP/OBS DSCHRG MGMT >30: CPT | Performed by: INTERNAL MEDICINE

## 2023-09-10 PROCEDURE — 250N000011 HC RX IP 250 OP 636: Mod: JZ | Performed by: HOSPITALIST

## 2023-09-10 PROCEDURE — 82248 BILIRUBIN DIRECT: CPT | Performed by: INTERNAL MEDICINE

## 2023-09-10 PROCEDURE — G0378 HOSPITAL OBSERVATION PER HR: HCPCS

## 2023-09-10 PROCEDURE — 84100 ASSAY OF PHOSPHORUS: CPT | Performed by: INTERNAL MEDICINE

## 2023-09-10 PROCEDURE — 85014 HEMATOCRIT: CPT | Performed by: INTERNAL MEDICINE

## 2023-09-10 PROCEDURE — 250N000013 HC RX MED GY IP 250 OP 250 PS 637: Performed by: INTERNAL MEDICINE

## 2023-09-10 PROCEDURE — 83735 ASSAY OF MAGNESIUM: CPT | Performed by: INTERNAL MEDICINE

## 2023-09-10 PROCEDURE — 36415 COLL VENOUS BLD VENIPUNCTURE: CPT | Performed by: INTERNAL MEDICINE

## 2023-09-10 PROCEDURE — 96376 TX/PRO/DX INJ SAME DRUG ADON: CPT

## 2023-09-10 RX ORDER — ESCITALOPRAM OXALATE 5 MG/1
5 TABLET ORAL DAILY
Qty: 90 TABLET | Refills: 0 | Status: SHIPPED | OUTPATIENT
Start: 2023-09-10

## 2023-09-10 RX ORDER — ESCITALOPRAM OXALATE 10 MG/1
10 TABLET ORAL DAILY
Qty: 90 TABLET | Refills: 0 | Status: SHIPPED | OUTPATIENT
Start: 2023-09-10

## 2023-09-10 RX ORDER — ATORVASTATIN CALCIUM 10 MG/1
10 TABLET, FILM COATED ORAL DAILY
Qty: 90 TABLET | Refills: 0 | Status: SHIPPED | OUTPATIENT
Start: 2023-09-10

## 2023-09-10 RX ADMIN — THIAMINE HYDROCHLORIDE 500 MG: 100 INJECTION, SOLUTION INTRAMUSCULAR; INTRAVENOUS at 08:54

## 2023-09-10 RX ADMIN — ESCITALOPRAM OXALATE 5 MG: 5 TABLET, FILM COATED ORAL at 08:54

## 2023-09-10 RX ADMIN — THIAMINE HYDROCHLORIDE 500 MG: 100 INJECTION, SOLUTION INTRAMUSCULAR; INTRAVENOUS at 01:03

## 2023-09-10 RX ADMIN — ATORVASTATIN CALCIUM 10 MG: 10 TABLET, FILM COATED ORAL at 08:52

## 2023-09-10 RX ADMIN — HYDRALAZINE HYDROCHLORIDE 10 MG: 20 INJECTION, SOLUTION INTRAMUSCULAR; INTRAVENOUS at 04:32

## 2023-09-10 RX ADMIN — ESCITALOPRAM 10 MG: 5 TABLET, FILM COATED ORAL at 08:54

## 2023-09-10 ASSESSMENT — ACTIVITIES OF DAILY LIVING (ADL)
ADLS_ACUITY_SCORE: 31

## 2023-09-10 NOTE — PROGRESS NOTES
Care Management Discharge Note    Discharge Date: 09/10/2023     Discharge Disposition: Home    Discharge Services: None    Discharge DME: None    Discharge Transportation: family or friend will provide    Education Provided on the Discharge Plan:  Yes   Persons Notified of Discharge Plans: Pt's family  Patient/Family in Agreement with the Plan: yes    Additional Information:  [facility] following for discharge planning, met with pt's dtr Shonda, son Remi and sister Lisa in family [facility] while pt rested. Due to pt's worsening cognition and SLUMS of 11/30, family is planning to have pt discharge into their care and will be moving pt in with dtr Shonda in [place] later this week. They are not interested in home care in [place]. They plan to establish care in [place] and then begin discussions about senior care/LTC near Shonda's home. They will request home care services from new PCP in [place] if they are interested in this. Answered questions regarding home care, PP PCA, county services/benefits, [facility]AL[facility] and LTC. They report that they will need to move pt's belongings out of his home and sell it, provided with [employer] and [employer] [facility] [place] booklet. All questions answered, no further needs.     Called [facility] and cancelled HC referral.     Katina Hope RN BSN   Inpatient Care Coordination  [facility]   Phone [phone]

## 2023-09-10 NOTE — PLAN OF CARE
PRIMARY DIAGNOSIS: Altered Mental Status  OUTPATIENT/OBSERVATION GOALS TO BE MET BEFORE DISCHARGE:  ADLs back to baseline: Yes    Activity and level of assistance: Ambulating independently.    Pain status: Pain free.    Return to near baseline physical activity: Yes     Discharge Planner Nurse   Safe discharge environment identified: Yes  Barriers to discharge: Yes       Entered by: Matthew Ames RN 09/09/2023 .  Vital signs:  Temp: 98.3  F (36.8  C) Temp src: Oral BP: 138/82 Pulse: 75   Resp: 18 SpO2: 97 % O2 Device: None (Room air)     Weight: 83 kg (182 lb 15.7 oz)      Please review provider order for any additional goals.   Nurse to notify provider when observation goals have been met and patient is ready for discharge.    Pt alert and oriented x 4.forgetful at times.VSS on RA .denies pain.PIV SL.Regular diet tolerating well.
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
## 2023-09-10 NOTE — PLAN OF CARE
PRIMARY DIAGNOSIS: Altered Mental Status  OUTPATIENT/OBSERVATION GOALS TO BE MET BEFORE DISCHARGE:  ADLs back to baseline: Yes    Activity and level of assistance: Up with standby assistance.    Pain status: Pain free.    Return to near baseline physical activity: Yes     Discharge Planner Nurse   Safe discharge environment identified: Yes  Barriers to discharge: No       Entered by: Idalmis Gates RN 09/10/2023    Pt awake, A&O x4, SBA, reg diet. PIV SL right arm. Leg tremor at baseline. Provider place pt up for discharge - pt to go home with family, eventually moving to [place] with them.   BP 95/65 (BP Location: Left arm)   Pulse 79   Temp 97.6  F (36.4  C) (Oral)   Resp 16   Wt 83 kg (182 lb 15.7 oz)   SpO2 96%       Please review provider order for any additional goals.   Nurse to notify provider when observation goals have been met and patient is ready for discharge.

## 2023-09-10 NOTE — PLAN OF CARE
PRIMARY DIAGNOSIS: Altered Mental Status  OUTPATIENT/OBSERVATION GOALS TO BE MET BEFORE DISCHARGE:  ADLs back to baseline: Yes    Activity and level of assistance: Up with standby assistance.    Pain status: Pain free.    Return to near baseline physical activity: Yes     Discharge Planner Nurse   Safe discharge environment identified: Yes  Barriers to discharge: Yes       Entered by: Idalmis Gates RN 09/10/2023    Pt awake, A&O x4, SBA, reg diet. PIV SL right arm. Leg tremor at baseline. elevated BP overnight and given PRN hydralazine but BP WDL this am 147/83.  Pt is calm and pleasant, wondering what the plan for discharge is.   Plan is for pt to go home with family, eventually moving to [place] with them.   PT/OT/SW following.    Please review provider order for any additional goals.   Nurse to notify provider when observation goals have been met and patient is ready for discharge.

## 2023-09-10 NOTE — PLAN OF CARE
Patient's After Visit Summary was reviewed with patient and/or family.   Patient verbalized understanding of After Visit Summary, recommended follow up and was given an opportunity to ask questions.   Discharge medications sent home with patient/family: paper prescriptions sent with pt and family.   Discharged with sister and family    BP 95/65 (BP Location: Left arm)   Pulse 79   Temp 97.6  F (36.4  C) (Oral)   Resp 16   Wt 83 kg (182 lb 15.7 oz)   SpO2 96%     Pt well and vss upon departure from rm 230. All paper prescriptions, belongings and paperwork sent home with pt and family.     OBSERVATION patient END time: 1320

## 2023-09-10 NOTE — PLAN OF CARE
PRIMARY DIAGNOSIS: Confusion  OUTPATIENT/OBSERVATION GOALS TO BE MET BEFORE DISCHARGE:  1. Pain Status: pt denies     2. Return to near baseline physical activity: Yes    3. Cleared for discharge by consultants (if involved): Yes    Discharge Planner Nurse   Safe discharge environment identified: Yes  Barriers to discharge: No       Entered by: Garrett Nava RN 09/10/2023 1:15 AM   Vitals are Temp: 97.8  F (36.6  C) Temp src: Oral BP: (!) 158/92 Pulse: 68   Resp: 16 SpO2: 95 %.  Patient is Alert and Oriented x4. /101@0430, prn.Hydralazine was given, BP now is 158/92. Tremor lower extremities when standing  and disappears in minute , patient reports feeling steady during tremor episode. Assisted SBA. Patient Talking to self unable to sleep, refused melatonin offered.    Please review provider order for any additional goals.   Nurse to notify provider when observation goals have been met and patient is ready for discharge.Goal Outcome Evaluation:

## 2023-09-10 NOTE — PLAN OF CARE
Physical Therapy Discharge Summary    Reason for therapy discharge:    Discharged to home.    Progress towards therapy goal(s). See goals on Care Plan in [facility] electronic health record for goal details.  Goals not met.  Barriers to achieving goals:   discharge from facility.    Therapy recommendation(s):    Pt's main limitations are cognition and balance. Pt is able to ambulate + feet and navigate stairs with assist. Pt is currently living alone without any family nearby that could stay with him. Recommend a more supportive living environment, such as [facility], to support pt's declining cognition and mobility deficits.

## 2023-09-10 NOTE — PLAN OF CARE
PRIMARY DIAGNOSIS: Altered Mental Status  OUTPATIENT/OBSERVATION GOALS TO BE MET BEFORE DISCHARGE:  ADLs back to baseline: Yes    Activity and level of assistance: Ambulating independently.    Pain status: Pain free.    Return to near baseline physical activity: Yes     Discharge Planner Nurse   Safe discharge environment identified: Yes  Barriers to discharge: Yes       Entered by: Matthew Ames RN 09/09/2023 .  Vital signs:  Temp: 98.3  F (36.8  C) Temp src: Oral BP: 138/82 Pulse: 75   Resp: 18 SpO2: 97 % O2 Device: None (Room air)     Weight: 83 kg (182 lb 15.7 oz)  There is no height or weight on file to calculate BMI.    Please review provider order for any additional goals.   Nurse to notify provider when observation goals have been met and patient is ready for discharge.

## 2023-09-11 NOTE — PLAN OF CARE
Occupational Therapy Discharge Summary    Reason for therapy discharge:    Discharged to home.    Progress towards therapy goal(s). See goals on Care Plan in [facility] electronic health record for goal details.  Goals not met.  Barriers to achieving goals:   discharge from facility.    Therapy recommendation(s):    No further therapy is recommended. Recommend assist with ADLs and IADLs due to cognitive impairments and balance deficits.

## 2023-11-05 ENCOUNTER — HEALTH MAINTENANCE LETTER (OUTPATIENT)
Age: 73
End: 2023-11-05

## 2024-12-22 ENCOUNTER — HEALTH MAINTENANCE LETTER (OUTPATIENT)
Age: 74
End: 2024-12-22